# Patient Record
Sex: FEMALE | Race: WHITE | NOT HISPANIC OR LATINO | Employment: OTHER | URBAN - METROPOLITAN AREA
[De-identification: names, ages, dates, MRNs, and addresses within clinical notes are randomized per-mention and may not be internally consistent; named-entity substitution may affect disease eponyms.]

---

## 2023-05-30 LAB
ERYTHROCYTE DISTRIBUTION WIDTH (RATIO) BY AUTOMATED COUNT: 13 % (ref 11.5–14.5)
ERYTHROCYTE MEAN CORPUSCULAR HEMOGLOBIN CONCENTRATION (G/DL) BY AUTOMATED: 31.6 G/DL (ref 32–36)
ERYTHROCYTE MEAN CORPUSCULAR VOLUME (FL) BY AUTOMATED COUNT: 103 FL (ref 80–100)
ERYTHROCYTES (10*6/UL) IN BLOOD BY AUTOMATED COUNT: 4.21 X10E12/L (ref 4–5.2)
HEMATOCRIT (%) IN BLOOD BY AUTOMATED COUNT: 43.3 % (ref 36–46)
HEMOGLOBIN (G/DL) IN BLOOD: 13.7 G/DL (ref 12–16)
LEUKOCYTES (10*3/UL) IN BLOOD BY AUTOMATED COUNT: 4.7 X10E9/L (ref 4.4–11.3)
PLATELETS (10*3/UL) IN BLOOD AUTOMATED COUNT: 191 X10E9/L (ref 150–450)

## 2023-09-14 PROBLEM — C44.41 BASAL CELL CARCINOMA OF SKIN OF SCALP AND NECK: Status: ACTIVE | Noted: 2020-06-17

## 2023-09-14 PROBLEM — L82.1 OTHER SEBORRHEIC KERATOSIS: Status: ACTIVE | Noted: 2020-06-17

## 2023-09-14 PROBLEM — L40.0 PSORIASIS VULGARIS: Status: ACTIVE | Noted: 2020-06-17

## 2023-09-14 PROBLEM — I71.21 ASCENDING AORTIC ANEURYSM (CMS-HCC): Status: ACTIVE | Noted: 2023-09-14

## 2023-09-14 PROBLEM — Z85.828 PERSONAL HISTORY OF OTHER MALIGNANT NEOPLASM OF SKIN: Status: ACTIVE | Noted: 2020-06-17

## 2023-09-14 PROBLEM — H25.813 COMBINED FORM OF AGE-RELATED CATARACT, BOTH EYES: Status: ACTIVE | Noted: 2023-09-14

## 2023-09-14 PROBLEM — D22.5 MELANOCYTIC NEVI OF TRUNK: Status: ACTIVE | Noted: 2020-06-17

## 2023-09-14 PROBLEM — H25.13 CATARACT, NUCLEAR SCLEROTIC, BOTH EYES: Status: ACTIVE | Noted: 2023-09-14

## 2023-09-14 PROBLEM — H35.372 MACULAR PUCKER, LEFT EYE: Status: ACTIVE | Noted: 2023-09-14

## 2023-09-14 PROBLEM — H52.11 MYOPIA OF RIGHT EYE: Status: ACTIVE | Noted: 2023-09-14

## 2023-09-14 PROBLEM — E78.5 HYPERLIPIDEMIA: Status: ACTIVE | Noted: 2023-09-14

## 2023-09-14 PROBLEM — H26.9 CATARACT OF RIGHT EYE: Status: ACTIVE | Noted: 2023-09-14

## 2023-09-14 PROBLEM — Z96.1 PSEUDOPHAKIA OF LEFT EYE: Status: ACTIVE | Noted: 2023-09-14

## 2023-09-14 PROBLEM — L30.9 DERMATITIS, UNSPECIFIED: Status: ACTIVE | Noted: 2020-06-17

## 2023-09-14 PROBLEM — H43.813 PVD (POSTERIOR VITREOUS DETACHMENT), BOTH EYES: Status: ACTIVE | Noted: 2023-09-14

## 2023-09-14 PROBLEM — D18.01 HEMANGIOMA OF SKIN AND SUBCUTANEOUS TISSUE: Status: ACTIVE | Noted: 2020-06-17

## 2023-09-14 PROBLEM — L90.5 SCAR CONDITION AND FIBROSIS OF SKIN: Status: ACTIVE | Noted: 2020-06-17

## 2023-09-14 PROBLEM — C44.91 BASAL CELL CARCINOMA: Status: ACTIVE | Noted: 2023-09-14

## 2023-09-14 PROBLEM — L57.0 ACTINIC KERATOSIS: Status: ACTIVE | Noted: 2020-06-17

## 2023-09-14 PROBLEM — L81.4 OTHER MELANIN HYPERPIGMENTATION: Status: ACTIVE | Noted: 2020-06-17

## 2023-09-14 PROBLEM — D48.5 NEOPLASM OF UNCERTAIN BEHAVIOR OF SKIN: Status: ACTIVE | Noted: 2020-06-17

## 2023-09-14 PROBLEM — I25.10 CORONARY ATHEROSCLEROSIS: Status: ACTIVE | Noted: 2023-09-14

## 2023-09-27 ENCOUNTER — OFFICE VISIT (OUTPATIENT)
Dept: PRIMARY CARE | Facility: CLINIC | Age: 73
End: 2023-09-27
Payer: MEDICARE

## 2023-09-27 VITALS
TEMPERATURE: 97.1 F | DIASTOLIC BLOOD PRESSURE: 78 MMHG | HEART RATE: 78 BPM | BODY MASS INDEX: 30.07 KG/M2 | WEIGHT: 191.6 LBS | OXYGEN SATURATION: 97 % | RESPIRATION RATE: 16 BRPM | SYSTOLIC BLOOD PRESSURE: 132 MMHG | HEIGHT: 67 IN

## 2023-09-27 DIAGNOSIS — Z12.12 SCREENING FOR COLORECTAL CANCER: ICD-10-CM

## 2023-09-27 DIAGNOSIS — L30.9 DERMATITIS, UNSPECIFIED: ICD-10-CM

## 2023-09-27 DIAGNOSIS — L40.0 PSORIASIS VULGARIS: ICD-10-CM

## 2023-09-27 DIAGNOSIS — Z00.00 HEALTHCARE MAINTENANCE: ICD-10-CM

## 2023-09-27 DIAGNOSIS — Z78.0 ASYMPTOMATIC MENOPAUSAL STATE: ICD-10-CM

## 2023-09-27 DIAGNOSIS — Z12.31 ENCOUNTER FOR SCREENING MAMMOGRAM FOR BREAST CANCER: ICD-10-CM

## 2023-09-27 DIAGNOSIS — Z00.00 ROUTINE GENERAL MEDICAL EXAMINATION AT HEALTH CARE FACILITY: Primary | ICD-10-CM

## 2023-09-27 DIAGNOSIS — Z00.00 HEALTH MAINTENANCE EXAMINATION: ICD-10-CM

## 2023-09-27 DIAGNOSIS — E78.5 HYPERLIPIDEMIA, UNSPECIFIED HYPERLIPIDEMIA TYPE: ICD-10-CM

## 2023-09-27 DIAGNOSIS — Z12.11 SCREENING FOR COLORECTAL CANCER: ICD-10-CM

## 2023-09-27 PROCEDURE — G0439 PPPS, SUBSEQ VISIT: HCPCS | Performed by: INTERNAL MEDICINE

## 2023-09-27 PROCEDURE — 1125F AMNT PAIN NOTED PAIN PRSNT: CPT | Performed by: INTERNAL MEDICINE

## 2023-09-27 PROCEDURE — 1159F MED LIST DOCD IN RCRD: CPT | Performed by: INTERNAL MEDICINE

## 2023-09-27 PROCEDURE — 1036F TOBACCO NON-USER: CPT | Performed by: INTERNAL MEDICINE

## 2023-09-27 PROCEDURE — 93000 ELECTROCARDIOGRAM COMPLETE: CPT | Performed by: INTERNAL MEDICINE

## 2023-09-27 PROCEDURE — 1170F FXNL STATUS ASSESSED: CPT | Performed by: INTERNAL MEDICINE

## 2023-09-27 PROCEDURE — 90662 IIV NO PRSV INCREASED AG IM: CPT | Performed by: INTERNAL MEDICINE

## 2023-09-27 PROCEDURE — G0008 ADMIN INFLUENZA VIRUS VAC: HCPCS | Performed by: INTERNAL MEDICINE

## 2023-09-27 PROCEDURE — 99397 PER PM REEVAL EST PAT 65+ YR: CPT | Performed by: INTERNAL MEDICINE

## 2023-09-27 RX ORDER — ROSUVASTATIN CALCIUM 10 MG/1
10 TABLET, COATED ORAL DAILY
Qty: 30 TABLET | Refills: 5 | Status: SHIPPED | OUTPATIENT
Start: 2023-09-27 | End: 2024-04-16 | Stop reason: SDUPTHER

## 2023-09-27 RX ORDER — ATORVASTATIN CALCIUM 20 MG/1
20 TABLET, FILM COATED ORAL DAILY
COMMUNITY
End: 2023-09-27 | Stop reason: SINTOL

## 2023-09-27 ASSESSMENT — ACTIVITIES OF DAILY LIVING (ADL)
DRESSING: INDEPENDENT
GROCERY_SHOPPING: INDEPENDENT
MANAGING_FINANCES: INDEPENDENT
DOING_HOUSEWORK: INDEPENDENT
TAKING_MEDICATION: INDEPENDENT
BATHING: INDEPENDENT

## 2023-09-27 ASSESSMENT — PATIENT HEALTH QUESTIONNAIRE - PHQ9
2. FEELING DOWN, DEPRESSED OR HOPELESS: NOT AT ALL
1. LITTLE INTEREST OR PLEASURE IN DOING THINGS: NOT AT ALL
SUM OF ALL RESPONSES TO PHQ9 QUESTIONS 1 AND 2: 0

## 2023-09-27 ASSESSMENT — ENCOUNTER SYMPTOMS
UNEXPECTED WEIGHT CHANGE: 0
WHEEZING: 0
HEADACHES: 0
LIGHT-HEADEDNESS: 0
HEMATURIA: 0
PALPITATIONS: 0
CHEST TIGHTNESS: 0
CONSTIPATION: 0
DIFFICULTY URINATING: 0
VOICE CHANGE: 0
FATIGUE: 0
DIARRHEA: 0
COUGH: 0
VOMITING: 0
ACTIVITY CHANGE: 0
NAUSEA: 0
APPETITE CHANGE: 0
DIZZINESS: 0
BLOOD IN STOOL: 0
TREMORS: 0
TROUBLE SWALLOWING: 0
ARTHRALGIAS: 0

## 2023-09-27 NOTE — PROGRESS NOTES
"Subjective   Reason for Visit: Lashawn Bear is an 73 y.o. female here for a Medicare Wellness visit.     Past Medical, Surgical, and Family History reviewed and updated in chart.    Reviewed all medications by prescribing practitioner or clinical pharmacist (such as prescriptions, OTCs, herbal therapies and supplements) and documented in the medical record.    74 yo here for AMWV  Feels well  No complaints    The rash on her legs improved off atorvastatin but recurred when started  Saw derm - allergic          Patient Care Team:  Montse Lock MD as PCP - General  Montse Lock MD as PCP - [employer] PCP     Review of Systems   Constitutional:  Negative for activity change, appetite change, fatigue and unexpected weight change.   HENT:  Negative for ear pain, hearing loss, tinnitus, trouble swallowing and voice change.    Eyes:  Negative for visual disturbance.   Respiratory:  Negative for cough, chest tightness and wheezing.    Cardiovascular:  Negative for chest pain, palpitations and leg swelling.   Gastrointestinal:  Negative for blood in stool, constipation, diarrhea, nausea and vomiting.   Genitourinary:  Negative for difficulty urinating, hematuria and vaginal bleeding.   Musculoskeletal:  Negative for arthralgias.   Skin:  Negative for rash.   Neurological:  Negative for dizziness, tremors, syncope, light-headedness and headaches.       Objective   Vitals:  /78   Pulse 78   Temp 36.2 °C (97.1 °F)   Resp 16   Ht 1.689 m (5' 6.5\")   Wt 86.9 kg (191 lb 9.6 oz)   SpO2 97%   BMI 30.46 kg/m²       Physical Exam  Constitutional:       General: She is not in acute distress.     Appearance: She is well-developed. She is not diaphoretic.   HENT:      Head: Normocephalic.      Right Ear: Tympanic membrane normal. There is no impacted cerumen.      Left Ear: Tympanic membrane normal. There is no impacted cerumen.      Nose: Nose normal.      Mouth/Throat:      Mouth: Mucous " membranes are moist.      Pharynx: Oropharynx is clear. No oropharyngeal exudate or posterior oropharyngeal erythema.   Eyes:      General: No scleral icterus.     Extraocular Movements: Extraocular movements intact.      Conjunctiva/sclera: Conjunctivae normal.      Pupils: Pupils are equal, round, and reactive to light.   Neck:      Thyroid: No thyromegaly.      Vascular: No JVD.   Cardiovascular:      Rate and Rhythm: Normal rate and regular rhythm.      Pulses: Normal pulses.      Heart sounds: Normal heart sounds. No murmur heard.     No friction rub. No gallop.   Pulmonary:      Effort: Pulmonary effort is normal. No respiratory distress.      Breath sounds: Normal breath sounds. No wheezing or rales.   Chest:      Chest wall: No tenderness.   Abdominal:      General: Bowel sounds are normal. There is no distension.      Palpations: Abdomen is soft. There is no mass.      Tenderness: There is no abdominal tenderness. There is no rebound.   Musculoskeletal:         General: Normal range of motion.      Cervical back: Normal range of motion and neck supple.   Lymphadenopathy:      Cervical: No cervical adenopathy.   Skin:     General: Skin is warm and dry.   Neurological:      General: No focal deficit present.      Mental Status: She is alert and oriented to person, place, and time.      Deep Tendon Reflexes: Reflexes normal.   Psychiatric:         Mood and Affect: Mood normal.         Thought Content: Thought content normal.         Assessment/Plan   Problem List Items Addressed This Visit       Dermatitis, unspecified    Hyperlipidemia    Current Assessment & Plan     On Rosuvastatin          Other Visit Diagnoses       Routine general medical examination at health care facility    -  Primary    Relevant Orders    1 Year Follow Up In [facility] - PCP - Wellness Exam    Health maintenance examination        Healthcare maintenance        Asymptomatic menopausal state        Relevant Orders    XR DEXA  bone density    Encounter for screening mammogram for breast cancer        Relevant Orders    BI mammo bilateral screening tomosynthesis    Screening for colorectal cancer        Relevant Orders    Cologuard® colon cancer screening             Follow up with me in 1 year

## 2023-10-08 LAB — NONINV COLON CA DNA+OCC BLD SCRN STL QL: NEGATIVE

## 2023-10-10 ENCOUNTER — ANCILLARY PROCEDURE (OUTPATIENT)
Dept: RADIOLOGY | Facility: CLINIC | Age: 73
End: 2023-10-10
Payer: MEDICARE

## 2023-10-10 DIAGNOSIS — Z78.0 ASYMPTOMATIC MENOPAUSAL STATE: ICD-10-CM

## 2023-10-10 PROCEDURE — 77080 DXA BONE DENSITY AXIAL: CPT | Performed by: STUDENT IN AN ORGANIZED HEALTH CARE EDUCATION/TRAINING PROGRAM

## 2023-10-10 PROCEDURE — 77080 DXA BONE DENSITY AXIAL: CPT

## 2023-12-12 ENCOUNTER — LAB (OUTPATIENT)
Dept: LAB | Facility: LAB | Age: 73
End: 2023-12-12
Payer: MEDICARE

## 2023-12-12 DIAGNOSIS — Z00.00 HEALTH MAINTENANCE EXAMINATION: ICD-10-CM

## 2023-12-12 DIAGNOSIS — L30.9 DERMATITIS, UNSPECIFIED: ICD-10-CM

## 2023-12-12 DIAGNOSIS — L40.0 PSORIASIS VULGARIS: ICD-10-CM

## 2023-12-12 DIAGNOSIS — Z00.00 HEALTHCARE MAINTENANCE: ICD-10-CM

## 2023-12-12 LAB
ALBUMIN SERPL BCP-MCNC: 4.1 G/DL (ref 3.4–5)
ALP SERPL-CCNC: 50 U/L (ref 33–136)
ALT SERPL W P-5'-P-CCNC: 10 U/L (ref 7–45)
ANION GAP SERPL CALC-SCNC: 12 MMOL/L (ref 10–20)
AST SERPL W P-5'-P-CCNC: 16 U/L (ref 9–39)
BILIRUB SERPL-MCNC: 0.8 MG/DL (ref 0–1.2)
BUN SERPL-MCNC: 11 MG/DL (ref 6–23)
CALCIUM SERPL-MCNC: 9.1 MG/DL (ref 8.6–10.3)
CHLORIDE SERPL-SCNC: 103 MMOL/L (ref 98–107)
CHOLEST SERPL-MCNC: 224 MG/DL (ref 0–199)
CHOLESTEROL/HDL RATIO: 3.3
CO2 SERPL-SCNC: 29 MMOL/L (ref 21–32)
CREAT SERPL-MCNC: 0.92 MG/DL (ref 0.5–1.05)
ERYTHROCYTE [DISTWIDTH] IN BLOOD BY AUTOMATED COUNT: 13.1 % (ref 11.5–14.5)
GFR SERPL CREATININE-BSD FRML MDRD: 66 ML/MIN/1.73M*2
GLUCOSE SERPL-MCNC: 97 MG/DL (ref 74–99)
HCT VFR BLD AUTO: 46 % (ref 36–46)
HDLC SERPL-MCNC: 67.8 MG/DL
HGB BLD-MCNC: 14.7 G/DL (ref 12–16)
LDLC SERPL CALC-MCNC: 142 MG/DL
MCH RBC QN AUTO: 32.7 PG (ref 26–34)
MCHC RBC AUTO-ENTMCNC: 32 G/DL (ref 32–36)
MCV RBC AUTO: 102 FL (ref 80–100)
NON HDL CHOLESTEROL: 156 MG/DL (ref 0–149)
NRBC BLD-RTO: 0 /100 WBCS (ref 0–0)
PLATELET # BLD AUTO: 179 X10*3/UL (ref 150–450)
POTASSIUM SERPL-SCNC: 4.2 MMOL/L (ref 3.5–5.3)
PROT SERPL-MCNC: 7 G/DL (ref 6.4–8.2)
RBC # BLD AUTO: 4.49 X10*6/UL (ref 4–5.2)
SODIUM SERPL-SCNC: 140 MMOL/L (ref 136–145)
TRIGL SERPL-MCNC: 69 MG/DL (ref 0–149)
TSH SERPL-ACNC: 1.57 MIU/L (ref 0.44–3.98)
VLDL: 14 MG/DL (ref 0–40)
WBC # BLD AUTO: 3.3 X10*3/UL (ref 4.4–11.3)

## 2023-12-12 PROCEDURE — 80061 LIPID PANEL: CPT

## 2023-12-12 PROCEDURE — 80053 COMPREHEN METABOLIC PANEL: CPT

## 2023-12-12 PROCEDURE — 84443 ASSAY THYROID STIM HORMONE: CPT

## 2023-12-12 PROCEDURE — 36415 COLL VENOUS BLD VENIPUNCTURE: CPT

## 2023-12-12 PROCEDURE — 85027 COMPLETE CBC AUTOMATED: CPT

## 2023-12-13 DIAGNOSIS — Z00.00 ANNUAL PHYSICAL EXAM: Primary | ICD-10-CM

## 2023-12-21 ENCOUNTER — ANCILLARY PROCEDURE (OUTPATIENT)
Dept: RADIOLOGY | Facility: CLINIC | Age: 73
End: 2023-12-21
Payer: MEDICARE

## 2023-12-21 DIAGNOSIS — Z12.31 ENCOUNTER FOR SCREENING MAMMOGRAM FOR BREAST CANCER: ICD-10-CM

## 2023-12-21 PROCEDURE — 77067 SCR MAMMO BI INCL CAD: CPT | Mod: BILATERAL PROCEDURE | Performed by: RADIOLOGY

## 2023-12-21 PROCEDURE — 77067 SCR MAMMO BI INCL CAD: CPT

## 2023-12-21 PROCEDURE — 77063 BREAST TOMOSYNTHESIS BI: CPT | Mod: BILATERAL PROCEDURE | Performed by: RADIOLOGY

## 2024-04-16 DIAGNOSIS — E78.5 HYPERLIPIDEMIA, UNSPECIFIED HYPERLIPIDEMIA TYPE: ICD-10-CM

## 2024-04-16 RX ORDER — ROSUVASTATIN CALCIUM 10 MG/1
10 TABLET, COATED ORAL DAILY
Qty: 90 TABLET | Refills: 3 | Status: SHIPPED | OUTPATIENT
Start: 2024-04-16 | End: 2025-04-16

## 2024-04-18 ENCOUNTER — LAB (OUTPATIENT)
Dept: LAB | Facility: LAB | Age: 74
End: 2024-04-18
Payer: MEDICARE

## 2024-04-18 DIAGNOSIS — Z00.00 ANNUAL PHYSICAL EXAM: ICD-10-CM

## 2024-04-18 LAB
CHOLEST SERPL-MCNC: 169 MG/DL (ref 0–199)
CHOLESTEROL/HDL RATIO: 2.5
HDLC SERPL-MCNC: 67.5 MG/DL
LDLC SERPL CALC-MCNC: 87 MG/DL
NON HDL CHOLESTEROL: 102 MG/DL (ref 0–149)
TRIGL SERPL-MCNC: 75 MG/DL (ref 0–149)
VLDL: 15 MG/DL (ref 0–40)

## 2024-04-18 PROCEDURE — 80061 LIPID PANEL: CPT

## 2024-04-18 PROCEDURE — 36415 COLL VENOUS BLD VENIPUNCTURE: CPT

## 2024-04-19 NOTE — RESULT ENCOUNTER NOTE
It's Dr. Trent Phillips, covering for Dr. Lock    Thank you for doing the fasting cholesterol panel.  I am pleased that the cholesterol panel has improved significantly from last time.      Sincerely,    Trent Phillpis MD   - covering physician for Dr. Lock  
No adenopathy or splenomegaly. No cervical or inguinal lymphadenopathy.

## 2024-09-24 ENCOUNTER — OFFICE VISIT (OUTPATIENT)
Dept: OBSTETRICS AND GYNECOLOGY | Facility: HOSPITAL | Age: 74
End: 2024-09-24
Payer: MEDICARE

## 2024-09-24 VITALS — BODY MASS INDEX: 31 KG/M2 | SYSTOLIC BLOOD PRESSURE: 132 MMHG | WEIGHT: 195 LBS | DIASTOLIC BLOOD PRESSURE: 77 MMHG

## 2024-09-24 DIAGNOSIS — N95.0 POSTMENOPAUSAL BLEEDING: Primary | ICD-10-CM

## 2024-09-24 PROCEDURE — 1160F RVW MEDS BY RX/DR IN RCRD: CPT | Performed by: OBSTETRICS & GYNECOLOGY

## 2024-09-24 PROCEDURE — 1159F MED LIST DOCD IN RCRD: CPT | Performed by: OBSTETRICS & GYNECOLOGY

## 2024-09-24 PROCEDURE — 99213 OFFICE O/P EST LOW 20 MIN: CPT | Performed by: OBSTETRICS & GYNECOLOGY

## 2024-09-24 PROCEDURE — 1036F TOBACCO NON-USER: CPT | Performed by: OBSTETRICS & GYNECOLOGY

## 2024-09-24 PROCEDURE — 1126F AMNT PAIN NOTED NONE PRSNT: CPT | Performed by: OBSTETRICS & GYNECOLOGY

## 2024-09-24 ASSESSMENT — ENCOUNTER SYMPTOMS
OCCASIONAL FEELINGS OF UNSTEADINESS: 0
LOSS OF SENSATION IN FEET: 0
DEPRESSION: 0

## 2024-09-24 ASSESSMENT — PAIN SCALES - GENERAL: PAINLEVEL: 0-NO PAIN

## 2024-09-24 NOTE — PROGRESS NOTES
"Subjective   Patient ID: Lashawn Bear \"Leona\" is a 74 y.o. female who presents for Abnormal Uterine Bleeding (Patient here to discuss postmenopausal bleeding/Patient reports bledding began second week of August/Patient reports bleeding on and off since then/not bleeding today/Patient reports having D&C previously for this issue/Patient denies falls/Patient denies pain).  74 year old patient, here today with complaint of vaginal bleeding in early August. Has been bleeding off and on since.   In 2020, underwent D&C for bleeding. Pathology showed atrophic tissue.   Not heavy- will use a tampon for the whole day. Started out like a menses.     Review of Systems   All other systems reviewed and are negative.    Objective   Physical Exam  Constitutional:       Appearance: Normal appearance.   HENT:      Head: Normocephalic and atraumatic.   Neurological:      General: No focal deficit present.      Mental Status: She is alert and oriented to person, place, and time.   Psychiatric:         Mood and Affect: Mood normal.         Thought Content: Thought content normal.     Assessment/Plan   Problem List Items Addressed This Visit    None  Visit Diagnoses         Codes    Postmenopausal bleeding    -  Primary N95.0    Relevant Orders    US PELVIS TRANSABDOMINAL WITH TRANSVAGINAL          Will likely need another D&C.        Buffy Busby MD 09/24/24 10:58 AM   "

## 2024-09-25 ENCOUNTER — HOSPITAL ENCOUNTER (OUTPATIENT)
Dept: RADIOLOGY | Facility: CLINIC | Age: 74
Discharge: HOME | End: 2024-09-25
Payer: MEDICARE

## 2024-09-25 DIAGNOSIS — N95.0 POSTMENOPAUSAL BLEEDING: ICD-10-CM

## 2024-09-25 PROCEDURE — 76830 TRANSVAGINAL US NON-OB: CPT | Performed by: RADIOLOGY

## 2024-09-25 PROCEDURE — 76856 US EXAM PELVIC COMPLETE: CPT

## 2024-09-25 PROCEDURE — 76857 US EXAM PELVIC LIMITED: CPT | Performed by: RADIOLOGY

## 2024-09-26 ENCOUNTER — APPOINTMENT (OUTPATIENT)
Dept: PRIMARY CARE | Facility: CLINIC | Age: 74
End: 2024-09-26
Payer: MEDICARE

## 2024-09-26 VITALS
HEART RATE: 77 BPM | DIASTOLIC BLOOD PRESSURE: 69 MMHG | SYSTOLIC BLOOD PRESSURE: 124 MMHG | OXYGEN SATURATION: 98 % | HEIGHT: 67 IN | WEIGHT: 194 LBS | TEMPERATURE: 97.2 F | BODY MASS INDEX: 30.45 KG/M2

## 2024-09-26 DIAGNOSIS — Z00.00 ROUTINE GENERAL MEDICAL EXAMINATION AT HEALTH CARE FACILITY: Primary | ICD-10-CM

## 2024-09-26 DIAGNOSIS — N93.8 DYSFUNCTIONAL UTERINE BLEEDING: ICD-10-CM

## 2024-09-26 DIAGNOSIS — E78.2 MIXED HYPERLIPIDEMIA: ICD-10-CM

## 2024-09-26 DIAGNOSIS — Z12.31 ENCOUNTER FOR SCREENING MAMMOGRAM FOR BREAST CANCER: ICD-10-CM

## 2024-09-26 DIAGNOSIS — Z00.00 ROUTINE GENERAL MEDICAL EXAMINATION AT A HEALTH CARE FACILITY: ICD-10-CM

## 2024-09-26 PROBLEM — I71.21 ASCENDING AORTIC ANEURYSM (CMS-HCC): Status: RESOLVED | Noted: 2023-09-14 | Resolved: 2024-09-26

## 2024-09-26 ASSESSMENT — ACTIVITIES OF DAILY LIVING (ADL)
MANAGING_FINANCES: INDEPENDENT
GROCERY_SHOPPING: INDEPENDENT
DRESSING: INDEPENDENT
BATHING: INDEPENDENT
TAKING_MEDICATION: INDEPENDENT
DOING_HOUSEWORK: INDEPENDENT

## 2024-09-26 ASSESSMENT — ENCOUNTER SYMPTOMS
COUGH: 0
LIGHT-HEADEDNESS: 0
WHEEZING: 0
BLOOD IN STOOL: 0
DIARRHEA: 0
DIFFICULTY URINATING: 0
PALPITATIONS: 0
UNEXPECTED WEIGHT CHANGE: 0
APPETITE CHANGE: 0
CONSTIPATION: 0
NAUSEA: 0
ARTHRALGIAS: 0
ACTIVITY CHANGE: 0
FATIGUE: 0
HEADACHES: 0
HEMATURIA: 0
VOMITING: 0
VOICE CHANGE: 0
DIZZINESS: 0
TREMORS: 0
CHEST TIGHTNESS: 0
TROUBLE SWALLOWING: 0

## 2024-09-26 NOTE — PROGRESS NOTES
"Subjective   Reason for Visit: Lashawn Bear is an 74 y.o. female here for a Medicare Wellness visit.     Past Medical, Surgical, and Family History reviewed and updated in chart.    Reviewed all medications by prescribing practitioner or clinical pharmacist (such as prescriptions, OTCs, herbal therapies and supplements) and documented in the medical record.    75 yo here for AMWV  Feels well  Taking all her meds  Has had vaginal bleeding for 7 weeks now - saw Dr Barbosa who ordered a pelvic US      2 children - daughter is an author - ; son  Nonsmoker  Alcohol - 7-10 per night  Marijuana - none  Exercise - 3-4 times a week      Patient Care Team:  Montse Lock MD as PCP - General  Montse Lock MD as PCP - [employer] PCP     Review of Systems   Constitutional:  Negative for activity change, appetite change, fatigue and unexpected weight change.   HENT:  Negative for ear pain, hearing loss, tinnitus, trouble swallowing and voice change.    Eyes:  Negative for visual disturbance.   Respiratory:  Negative for cough, chest tightness and wheezing.    Cardiovascular:  Negative for chest pain, palpitations and leg swelling.   Gastrointestinal:  Negative for blood in stool, constipation, diarrhea, nausea and vomiting.   Genitourinary:  Negative for difficulty urinating, hematuria and vaginal bleeding.   Musculoskeletal:  Negative for arthralgias.   Skin:  Negative for rash.   Neurological:  Negative for dizziness, tremors, syncope, light-headedness and headaches.       Objective   Vitals:  /69   Pulse 77   Temp 36.2 °C (97.2 °F)   Ht 1.689 m (5' 6.5\")   Wt 88 kg (194 lb)   SpO2 98%   BMI 30.84 kg/m²       Physical Exam  Constitutional:       General: She is not in acute distress.     Appearance: She is well-developed. She is not diaphoretic.   HENT:      Head: Normocephalic.      Right Ear: Tympanic membrane normal. There is no impacted cerumen.      Left " Ear: Tympanic membrane normal. There is no impacted cerumen.      Nose: Nose normal.      Mouth/Throat:      Mouth: Mucous membranes are moist.      Pharynx: Oropharynx is clear. No oropharyngeal exudate or posterior oropharyngeal erythema.   Eyes:      General: No scleral icterus.     Extraocular Movements: Extraocular movements intact.      Conjunctiva/sclera: Conjunctivae normal.      Pupils: Pupils are equal, round, and reactive to light.   Neck:      Thyroid: No thyromegaly.      Vascular: No JVD.   Cardiovascular:      Rate and Rhythm: Normal rate and regular rhythm.      Pulses: Normal pulses.      Heart sounds: Normal heart sounds. No murmur heard.     No friction rub. No gallop.   Pulmonary:      Effort: Pulmonary effort is normal. No respiratory distress.      Breath sounds: Normal breath sounds. No wheezing or rales.   Chest:      Chest wall: No tenderness.   Abdominal:      General: Bowel sounds are normal. There is no distension.      Palpations: Abdomen is soft. There is no mass.      Tenderness: There is no abdominal tenderness. There is no rebound.   Musculoskeletal:         General: Normal range of motion.      Cervical back: Normal range of motion and neck supple.   Lymphadenopathy:      Cervical: No cervical adenopathy.   Skin:     General: Skin is warm and dry.   Neurological:      General: No focal deficit present.      Mental Status: She is alert and oriented to person, place, and time.      Deep Tendon Reflexes: Reflexes normal.   Psychiatric:         Mood and Affect: Mood normal.         Thought Content: Thought content normal.         Assessment & Plan  Routine general medical examination at health care facility    Orders:    1 Year Follow Up In [facility] - PCP - Wellness Exam    ECG 12 lead (Clinic Performed)    1 Year Follow Up In [facility] - PCP - Wellness Exam; Future    Routine general medical examination at a health care facility    Orders:    Comprehensive  Metabolic Panel; Future    Lipid Panel; Future    TSH with reflex to Free T4 if abnormal; Future    Dysfunctional uterine bleeding  Per Gyn -  Orders:    CBC; Future    Encounter for screening mammogram for breast cancer    Orders:    BI mammo bilateral screening tomosynthesis; Future    Mixed hyperlipidemia  On statin        [facility] docs on file - full code      Depression Screening  5 - 10 minutes were spent screening for depression.   Follow up with me in 1 year

## 2024-09-27 ENCOUNTER — LAB (OUTPATIENT)
Dept: LAB | Facility: LAB | Age: 74
End: 2024-09-27
Payer: MEDICARE

## 2024-09-27 ENCOUNTER — TELEPHONE (OUTPATIENT)
Dept: OBSTETRICS AND GYNECOLOGY | Facility: HOSPITAL | Age: 74
End: 2024-09-27

## 2024-09-27 ENCOUNTER — PREP FOR PROCEDURE (OUTPATIENT)
Dept: OBSTETRICS AND GYNECOLOGY | Facility: HOSPITAL | Age: 74
End: 2024-09-27

## 2024-09-27 DIAGNOSIS — N95.0 POSTMENOPAUSAL BLEEDING: Primary | ICD-10-CM

## 2024-09-27 DIAGNOSIS — N93.8 DYSFUNCTIONAL UTERINE BLEEDING: ICD-10-CM

## 2024-09-27 DIAGNOSIS — Z00.00 ROUTINE GENERAL MEDICAL EXAMINATION AT A HEALTH CARE FACILITY: ICD-10-CM

## 2024-09-27 LAB
ALBUMIN SERPL BCP-MCNC: 4 G/DL (ref 3.4–5)
ALP SERPL-CCNC: 56 U/L (ref 33–136)
ALT SERPL W P-5'-P-CCNC: 11 U/L (ref 7–45)
ANION GAP SERPL CALC-SCNC: 14 MMOL/L (ref 10–20)
AST SERPL W P-5'-P-CCNC: 13 U/L (ref 9–39)
BILIRUB SERPL-MCNC: 0.8 MG/DL (ref 0–1.2)
BUN SERPL-MCNC: 11 MG/DL (ref 6–23)
CALCIUM SERPL-MCNC: 9 MG/DL (ref 8.6–10.6)
CHLORIDE SERPL-SCNC: 104 MMOL/L (ref 98–107)
CHOLEST SERPL-MCNC: 166 MG/DL (ref 0–199)
CHOLESTEROL/HDL RATIO: 2.4
CO2 SERPL-SCNC: 29 MMOL/L (ref 21–32)
CREAT SERPL-MCNC: 0.92 MG/DL (ref 0.5–1.05)
EGFRCR SERPLBLD CKD-EPI 2021: 66 ML/MIN/1.73M*2
ERYTHROCYTE [DISTWIDTH] IN BLOOD BY AUTOMATED COUNT: 12.6 % (ref 11.5–14.5)
GLUCOSE SERPL-MCNC: 95 MG/DL (ref 74–99)
HCT VFR BLD AUTO: 42.8 % (ref 36–46)
HDLC SERPL-MCNC: 68.8 MG/DL
HGB BLD-MCNC: 14.3 G/DL (ref 12–16)
LDLC SERPL CALC-MCNC: 87 MG/DL
MCH RBC QN AUTO: 32.9 PG (ref 26–34)
MCHC RBC AUTO-ENTMCNC: 33.4 G/DL (ref 32–36)
MCV RBC AUTO: 99 FL (ref 80–100)
NON HDL CHOLESTEROL: 97 MG/DL (ref 0–149)
NRBC BLD-RTO: 0 /100 WBCS (ref 0–0)
PLATELET # BLD AUTO: 204 X10*3/UL (ref 150–450)
POTASSIUM SERPL-SCNC: 4.5 MMOL/L (ref 3.5–5.3)
PROT SERPL-MCNC: 6.5 G/DL (ref 6.4–8.2)
RBC # BLD AUTO: 4.34 X10*6/UL (ref 4–5.2)
SODIUM SERPL-SCNC: 142 MMOL/L (ref 136–145)
TRIGL SERPL-MCNC: 51 MG/DL (ref 0–149)
TSH SERPL-ACNC: 1.69 MIU/L (ref 0.44–3.98)
VLDL: 10 MG/DL (ref 0–40)
WBC # BLD AUTO: 4.7 X10*3/UL (ref 4.4–11.3)

## 2024-09-27 PROCEDURE — 36415 COLL VENOUS BLD VENIPUNCTURE: CPT

## 2024-09-27 RX ORDER — SODIUM CHLORIDE, SODIUM LACTATE, POTASSIUM CHLORIDE, CALCIUM CHLORIDE 600; 310; 30; 20 MG/100ML; MG/100ML; MG/100ML; MG/100ML
20 INJECTION, SOLUTION INTRAVENOUS CONTINUOUS
OUTPATIENT
Start: 2024-09-27

## 2024-09-27 NOTE — TELEPHONE ENCOUNTER
----- Message from Buffy Busby sent at 9/27/2024  7:14 AM EDT -----  Endometrium 7 mm. Recommend endometrial sampling. We had trouble previously getting an EMB and had to do a hysteroscopy for her. Can we confirm that is her preference. Thanks.

## 2024-09-27 NOTE — TELEPHONE ENCOUNTER
RN called patient to discuss ultrasound results  Left voicemail encouraging to call the office back    Janice COBBN, RN

## 2024-09-27 NOTE — TELEPHONE ENCOUNTER
Patient returning RN's call  Verified name and   RN informed patient that endometrium was measuring at 7mm thickness and an endometrium biopsy is recommended  Patient understood   Due to patient's history of having difficulty obtaining an EMB in office a hysteroscopy was offered per Dr. Busby   Patient prefers to have hysteroscopy and if necessary D&C in the OR.   RN made Dr. Busby aware.     All questions and concerns were addressed at the time of the call.   Janice COBBN, RN

## 2024-10-01 ENCOUNTER — TELEPHONE (OUTPATIENT)
Dept: OBSTETRICS AND GYNECOLOGY | Facility: HOSPITAL | Age: 74
End: 2024-10-01
Payer: MEDICARE

## 2024-10-01 NOTE — TELEPHONE ENCOUNTER
Called patient and left message. It appears patient was previously contacted by GYN scheduling  [phone]. Left message for patient to call back.  JEROD TORRES RN

## 2024-10-02 ENCOUNTER — TELEPHONE (OUTPATIENT)
Dept: OBSTETRICS AND GYNECOLOGY | Facility: HOSPITAL | Age: 74
End: 2024-10-02
Payer: MEDICARE

## 2024-10-02 PROBLEM — N95.0 POSTMENOPAUSAL BLEEDING: Status: ACTIVE | Noted: 2024-09-27

## 2024-10-02 NOTE — TELEPHONE ENCOUNTER
Patient called to find out how to schedule her procedure. Patient advised to call [phone] to schedule her procedure. Patient confirmed understanding and no further questions or concerns. JEROD TORRES RN

## 2024-10-04 DIAGNOSIS — N95.0 POSTMENOPAUSAL BLEEDING: Primary | ICD-10-CM

## 2024-10-28 NOTE — PREPROCEDURE INSTRUCTIONS
Pre-Op Instructions &?Checklist       Your surgery has been scheduled at [facility] at [address] [place], [address], [address], in the [facility]. Parking is to the left of the main entrance.      You will be contacted about the time of your surgery the day before your surgery (if your surgery is on a Monday, you will be called the Friday before surgery). If you are unable to answer the phone, a detailed voicemail message will be left. Make sure that your voicemail box is not full so a message can be left. If you have not received a call by 3:00 pm you may call [phone] between the hours of 3:00 and 4:00 pm. Please be available by phone the night before/day of surgery in case there is a change in the schedule which may require you to arrive earlier/later.      ?      14 DAYS BEFORE SURGERY STOP TAKING WEIGHT LOSS MEDICATIONS       ?7 DAYS BEFORE SURGERY STOP THESE MEDICATIONS:       * Multiple Vitamins containing Vitamin E       * Herbal supplements, Fish Oil, garlic pills, turmeric, CoQ enzyme       *Stop taking aspirin, aspirin-containing products, and NSAID's like Advil, Motrin, Aleve, and Ibuprofen. Tylenol is okay to take for pain relief.        *If you are currently taking Coumadin/Warfarin, we will have to coordinate that with your PCP &/or the Anticoagulation Clinic.      THE DAY BEFORE SURGERY:       *Do not eat any food after midnight the night before surgery.        *You are permitted to have no more than 4 ounces of clear liquids such as water, apple juice, plain tea or coffee (no milk or creamer), clear electrolyte-replenishing drinks such as Pedialyte, Gatorade, or         Powerade  (not yogurt or pulp-containing smoothies or juices such as orange juice) up to 3 hours before your arrival time.      DAY OF SURGERY TAKE THESE MEDICATIONS (if it is not listed, do not take it.)    There are no medications for you to take on the morning of surgery.     ON THE  MORNING OF SURGERY:       *Shower either the night before your surgery or the morning of your surgery       *Do not use moisturizers, creams, lotions or perfume, or make-up.       *Wear comfortable, loose fitting clothing.        *All jewelry and valuables should be left at home.       *Prosthetic devices such as contact lenses, hearing aids, dentures, eyelash extensions, hairpins and body piercings must be removed before surgery. Bring containers for eyeglasses/contacts, dentures, or         hearing aids with you.      ? Diabetics: Please check fasting blood sugars upon waking up. ?If fasting blood sugars are <80ml/dl, please drink 100ml/3oz. of apple juice no later than 2 hours prior to surgery.      ?BRING WITH YOU:        *Photo ID and insurance card       *Current list of medicines and allergies       *Pacemaker/Defibrillator/Heart stent cards       *Copy of your complete Advanced Directive/DHPOA-if applicable      ?SMOKING:       *Quitting smoking can make a huge difference to your health and recovery from surgery. ?       *If you need help with quitting, call 2-642-QUIT-NOW.        ALCOHOL:       *No alcoholic beverages for 48 hours before surgery.      ?AFTER OUTPATIENT SURGERY:       *A responsible adult MUST accompany you at the time of discharge and stay with you for 24 hours after your surgery.       *You may NOT drive yourself home after surgery.       *You may use a taxi or ride sharing service ([employer], [employer]) to return home ONLY if you are accompanied by a friend or family member       *Instructions for resuming your medications will be provided by your surgeon.      CONTACT SURGEON'S OFFICE IF YOU DEVELOP:       *Fever =/>?100.4 F        *New respiratory symptoms (e.g. cough, shortness of breath, respiratory distress, sore throat)       *Recent loss of taste or smell       *Flu like symptoms such as headache, fatigue or gastrointestinal symptoms       *If you develop any open sores, shingles, burning or  painful urination    AND/OR:       *You no longer wish to have the surgery.       *Any other personal circumstances change that may lead to the need to cancel or defer this surgery.       *You were admitted to any hospital within one week of your planned procedure.      ?If you have any questions regarding these preoperative instructions, you may call [phone]. If you have questions regarding you surgical procedure, or post-operative care/recovery please call your surgeon's office.      Link to [facility] Laboratory Services Locations   [email or web address]      Link to [facility]   [email or web address]

## 2024-10-28 NOTE — CPM/PAT H&P
CPM/PAT Evaluation       Name: Lashawn Bear   /Age: 1950       TELEMEDICINE ENCOUNTER  Patient was interviewed by telephone for preadmission testing perioperative risk assessment prior to surgery.    DATE OF CONSULT: 10/28/2024  REFERRING PROVIDER: Dr. Busby  SURGERY, DATE, AND LENGTH: Hysteroscopy with D&C; 2024; 60 minutes    CHIEF COMPLAINT  Postmenopausal bleeding  HPI  Lashawn Bear is a 74-year-old female with intermittent postmenopausal bleeding that began in mid 2024.  Bleeding is not heavy, no clots.  Ultrasound done on 2024 showed endometrium measuring 7 mm in thickness.  Patient has history of D&C for bleeding in .  Patient scheduled for recommended hysteroscopy with D&C.    ACTIVE PROBLEMS  Patient Active Problem List   Diagnosis    Actinic keratosis    Other seborrheic keratosis    Basal cell carcinoma    Basal cell carcinoma of skin of scalp and neck    Cataract, nuclear sclerotic, both eyes    Cataract of right eye    Combined form of age-related cataract, both eyes    Coronary atherosclerosis    Hemangioma of skin and subcutaneous tissue    Dermatitis, unspecified    Scar condition and fibrosis of skin    Hyperlipidemia    Macular pucker, left eye    Melanocytic nevi of trunk    Myopia of right eye    Neoplasm of uncertain behavior of skin    Other melanin hyperpigmentation    Personal history of other malignant neoplasm of skin    Pseudophakia of left eye    Psoriasis vulgaris    PVD (posterior vitreous detachment), both eyes    Postmenopausal bleeding        PAST MEDICAL HISTORY  Past Medical History:   Diagnosis Date    Acute vaginitis     Vaginal infection    Basal cell carcinoma of skin of other part of trunk     Basal cell carcinoma of back    Calculus of gallbladder with acute cholecystitis without obstruction 2021    Acute calculous cholecystitis    Combined forms of age-related cataract, right eye 10/21/2021    Combined forms of  age-related cataract of right eye    Menopausal and female climacteric states     Menopausal symptoms    Myopia, bilateral 2021    Bilateral myopia    Other conditions influencing health status 2021    Small bowel perforation, intraoperative    Personal history of other diseases of the female genital tract     History of breast disorder    Personal history of other diseases of the female genital tract 2020    History of postmenopausal bleeding    Personal history of other medical treatment     History of mammogram    Personal history of other medical treatment 2019    History of screening mammography    Personal history of other specified conditions 2020    No post-op complications    Presbyopia 10/21/2021    Presbyopia O[facility]    Puckering of macula, left eye 10/21/2021    Macular puckering of retina, left eye    Unspecified astigmatism, bilateral 10/21/2021    Astigmatism, bilateral    Unspecified cataract 2021    Cataract of left eye        SURGICAL HISTORY  Past Surgical History:   Procedure Laterality Date    APPENDECTOMY  2014    Appendectomy    BREAST BIOPSY  2014    Biopsy Breast Open     SECTION, CLASSIC  2014     Section    OOPHORECTOMY  2014    Oophorectomy - Unilateral (Removal Of One Ovary)    OTHER SURGICAL HISTORY  2020    Dilation and curettage    OTHER SURGICAL HISTORY  2019    Mohs surgery    OTHER SURGICAL HISTORY  2018    Biopsy Skin    OTHER SURGICAL HISTORY  2021    Cataract surgery    OTHER SURGICAL HISTORY  2021    Small bowel resection    OTHER SURGICAL HISTORY  2021    Cholecystectomy laparoscopic    TONSILLECTOMY  2014    Tonsillectomy        ANESTHESIA HISTORY  Denies problems with anesthesia in the past such as PONV, prolonged sedation, awareness, dental damage, aspiration, cardiac arrest, difficult intubation, or unexpected hospital admissions. Denies family history of  malignant hyperthermia, or pseudocholinesterase deficiency.     SOCIAL HISTORY  Former cigarette smoker; occasional glass of wine; no recreational drug use.  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  She states she is able to do moderate activities such as heavy housework, light yard work.  She denies chest pain, HENSON.  METS 4    FAMILY HISTORY  Family History   Problem Relation Name Age of Onset    Basal cell carcinoma Mother      Other (CARDIAC DISORDER) Mother      Diabetes Father      Alzheimer's disease Father      Breast cancer Sister  65    Seizures Sister      Other (TRAMATIC BRAIN INJURY) Sister          ALLERGIES  No Known Allergies     MEDICATIONS  No current facility-administered medications for this encounter.    Current Outpatient Medications:     rosuvastatin (Crestor) 10 mg tablet, Take 1 tablet (10 mg) by mouth once daily., Disp: 90 tablet, Rfl: 3     REVIEW OF SYSTEMS  Review of Systems   Genitourinary:         Postmenopausal bleeding; thickened endometrium   All other systems reviewed and are negative.    STOP BANG: Negative for BIBI      PHYSICAL EXAM  Deferred    AIRWAY EXAM  Deferred    VITALS  No vitals taken for telemedicine visit  BMI Readings from Last 1 Encounters:   09/26/24 30.84 kg/m²      BP Readings from Last 4 Encounters:   09/26/24 124/69   09/24/24 132/77   09/27/23 132/78   12/20/22 143/84        LABS  Lab Results   Component Value Date    WBC 4.7 09/27/2024    HGB 14.3 09/27/2024    HCT 42.8 09/27/2024    MCV 99 09/27/2024     09/27/2024      Lab Results   Component Value Date    GLUCOSE 95 09/27/2024    CALCIUM 9.0 09/27/2024     09/27/2024    K 4.5 09/27/2024    CO2 29 09/27/2024     09/27/2024    BUN 11 09/27/2024    CREATININE 0.92 09/27/2024      Lab Results   Component Value Date    HGBA1C 5.6 06/21/2021      Lab Results   Component Value Date    CHOL 166 09/27/2024    CHOL 169 04/18/2024    CHOL 224 (H) 12/12/2023     Lab Results   Component  "Value Date    HDL 68.8 09/27/2024    HDL 67.5 04/18/2024    HDL 67.8 12/12/2023     Lab Results   Component Value Date    LDLCALC 87 09/27/2024    LDLCALC 87 04/18/2024    LDLCALC 142 (H) 12/12/2023     Lab Results   Component Value Date    TRIG 51 09/27/2024    TRIG 75 04/18/2024    TRIG 69 12/12/2023     No components found for: \"CHOLHDL\"     IMAGING  Encounter Date: 09/26/24   ECG 12 lead (Clinic Performed)    Narrative    NSR   No ST/T changes  Stable from previous year        Echocardiogram from 06/21/2021  Indication:  Procedure/CPT: Echo Complete w Full [id number]   Study Detail: The following Echo studies were performed: 2D, M-Mode, Doppler and                color flow. Image quality for this study is good.        PHYSICIAN INTERPRETATION:  Left Ventricle: The left ventricular systolic function is normal, with an estimated ejection fraction of 60-65%. There are no regional wall motion abnormalities. The left ventricular cavity size is normal. Spectral Doppler shows a normal pattern of left ventricular diastolic filling.  Left Atrium: The left atrium is normal in size.  Right Ventricle: The right ventricle is normal in size. There is normal right ventricular global systolic function.  Right Atrium: The right atrium is normal in size.  Aortic Valve: The aortic valve is trileaflet. There is minimal aortic valve cusp calcification. There is trace to mild aortic valve regurgitation. The peak instantaneous gradient of the aortic valve is 15.1 mmHg.  Mitral Valve: The mitral valve is normal in structure. There is no evidence of mitral valve regurgitation.  Tricuspid Valve: The tricuspid valve is structurally normal. There is mild tricuspid regurgitation. The Doppler estimated RVSP is within normal limits at 12.6 mmHg.  Pulmonic Valve: The pulmonic valve is structurally normal. There is physiologic pulmonic valve regurgitation.  Pericardium: There is no pericardial effusion noted.  Aorta: The aortic root is " normal. There is no dilatation of the aortic arch. There is no dilatation of the ascending aorta. There is no dilatation of the aortic root.  Systemic Veins: The inferior vena cava appears to be of normal size. There is IVC inspiratory collapse greater than 50%.        CONCLUSIONS:   1. The left ventricular systolic function is normal with a 60-65% estimated ejection fraction.   2. RVSP within normal limits.       ASSESSMENT/PLAN  Postmenopausal bleeding  Hysteroscopy with D&C      Preoperative instructions reviewed in detail with patient during this encounter. A copy of these instructions has been unloaded to  [facility] along with a copy sent to either home email address or mailed to home address.    This note was created in part upon personal review of patient's medical records.  Speech recognition transcription software was used in the creation of this note. Despite proofreading, several typographical errors might be present that might affect the meaning of the content.

## 2024-11-04 ENCOUNTER — TELEPHONE (OUTPATIENT)
Dept: OBSTETRICS AND GYNECOLOGY | Facility: HOSPITAL | Age: 74
End: 2024-11-04
Payer: MEDICARE

## 2024-11-04 NOTE — TELEPHONE ENCOUNTER
"----- Message from Jeanine JOHNSON sent at 10/2/2024  2:39 PM EDT -----  Regarding: ELVIN Busby has a procedure 24 at [facility] in the pm with the attached patient  FYJOHN Busby has a procedure 24 at [facility] in the pm with the attached patient    Letters stating \"Getting Ready For Surgery\", \"Gynecologic Surgery: What to expect\" and the lab locations have been emailed to the patient today.     DR Busby, Pre-OP and or labs can now be ordered if you havent already done so, Thank you, Jeanine    Contacted patient to discuss pre/post op education  Patient identified by name and   Patient scheduled for a Hysteroscopy/D&C with Dr. Busby at [facility] on   Informed patient that:  She will receive a call from the outpatient surgery center the night prior to let her know what time to arrive  She will need someone to drive her to the facility and home  She will need to be NPO for 8 hours prior to arrival time (clear liquids okay up until arrival)  She will need to complete lab work (CBC and T&S) 2-3 days prior to procedure. Patient aware that orders are already in the system and that she will complete 2- days prior to procedure at any  lab  She will shower the night before/morning of and to refrain from wearing jewelry, piercings, contacts, makeup, lotions, deodorants  She should contact the office for any abnormal post op symptoms  Procedure information, GYN what to expect, and enhanced recovery information to be sent to patient via [employer]  Patient scheduled for POV on  with Dr. Busby   All questions and concerns addressed  Encouraged patient to call office with any future questions or concerns   Sophia House RN   "

## 2024-11-05 ENCOUNTER — LAB (OUTPATIENT)
Dept: LAB | Facility: LAB | Age: 74
End: 2024-11-05
Payer: MEDICARE

## 2024-11-05 ENCOUNTER — ANESTHESIA EVENT (OUTPATIENT)
Dept: OPERATING ROOM | Facility: CLINIC | Age: 74
End: 2024-11-05
Payer: MEDICARE

## 2024-11-05 DIAGNOSIS — N95.0 POSTMENOPAUSAL BLEEDING: ICD-10-CM

## 2024-11-05 LAB
ANION GAP SERPL CALC-SCNC: 13 MMOL/L (ref 10–20)
BUN SERPL-MCNC: 13 MG/DL (ref 6–23)
CALCIUM SERPL-MCNC: 9.2 MG/DL (ref 8.6–10.6)
CHLORIDE SERPL-SCNC: 104 MMOL/L (ref 98–107)
CO2 SERPL-SCNC: 29 MMOL/L (ref 21–32)
CREAT SERPL-MCNC: 0.89 MG/DL (ref 0.5–1.05)
EGFRCR SERPLBLD CKD-EPI 2021: 69 ML/MIN/1.73M*2
ERYTHROCYTE [DISTWIDTH] IN BLOOD BY AUTOMATED COUNT: 12.7 % (ref 11.5–14.5)
GLUCOSE SERPL-MCNC: 106 MG/DL (ref 74–99)
HCT VFR BLD AUTO: 45.7 % (ref 36–46)
HGB BLD-MCNC: 14.7 G/DL (ref 12–16)
MCH RBC QN AUTO: 32.2 PG (ref 26–34)
MCHC RBC AUTO-ENTMCNC: 32.2 G/DL (ref 32–36)
MCV RBC AUTO: 100 FL (ref 80–100)
NRBC BLD-RTO: 0 /100 WBCS (ref 0–0)
PLATELET # BLD AUTO: 174 X10*3/UL (ref 150–450)
POTASSIUM SERPL-SCNC: 5.1 MMOL/L (ref 3.5–5.3)
RBC # BLD AUTO: 4.56 X10*6/UL (ref 4–5.2)
SODIUM SERPL-SCNC: 141 MMOL/L (ref 136–145)
WBC # BLD AUTO: 3.6 X10*3/UL (ref 4.4–11.3)

## 2024-11-05 PROCEDURE — 85027 COMPLETE CBC AUTOMATED: CPT

## 2024-11-05 PROCEDURE — 86850 RBC ANTIBODY SCREEN: CPT

## 2024-11-05 PROCEDURE — 86901 BLOOD TYPING SEROLOGIC RH(D): CPT

## 2024-11-05 PROCEDURE — 36415 COLL VENOUS BLD VENIPUNCTURE: CPT

## 2024-11-05 PROCEDURE — 80048 BASIC METABOLIC PNL TOTAL CA: CPT

## 2024-11-05 PROCEDURE — 86900 BLOOD TYPING SEROLOGIC ABO: CPT

## 2024-11-06 ENCOUNTER — LAB REQUISITION (OUTPATIENT)
Dept: LAB | Facility: HOSPITAL | Age: 74
End: 2024-11-06
Payer: MEDICARE

## 2024-11-06 DIAGNOSIS — N95.0 POSTMENOPAUSAL BLEEDING: ICD-10-CM

## 2024-11-06 LAB
ABO GROUP (TYPE) IN BLOOD: NORMAL
ANTIBODY SCREEN: NORMAL
RH FACTOR (ANTIGEN D): NORMAL

## 2024-11-08 ENCOUNTER — HOSPITAL ENCOUNTER (OUTPATIENT)
Facility: CLINIC | Age: 74
Setting detail: OUTPATIENT SURGERY
Discharge: HOME | End: 2024-11-08
Attending: OBSTETRICS & GYNECOLOGY | Admitting: OBSTETRICS & GYNECOLOGY
Payer: MEDICARE

## 2024-11-08 ENCOUNTER — ANESTHESIA (OUTPATIENT)
Dept: OPERATING ROOM | Facility: CLINIC | Age: 74
End: 2024-11-08
Payer: MEDICARE

## 2024-11-08 VITALS
OXYGEN SATURATION: 98 % | HEIGHT: 67 IN | WEIGHT: 196.21 LBS | HEART RATE: 66 BPM | TEMPERATURE: 97.3 F | BODY MASS INDEX: 30.8 KG/M2 | SYSTOLIC BLOOD PRESSURE: 136 MMHG | RESPIRATION RATE: 16 BRPM | DIASTOLIC BLOOD PRESSURE: 68 MMHG

## 2024-11-08 DIAGNOSIS — N95.0 POSTMENOPAUSAL BLEEDING: Primary | ICD-10-CM

## 2024-11-08 PROCEDURE — 7100000002 HC RECOVERY ROOM TIME - EACH INCREMENTAL 1 MINUTE: Performed by: OBSTETRICS & GYNECOLOGY

## 2024-11-08 PROCEDURE — 3600000008 HC OR TIME - EACH INCREMENTAL 1 MINUTE - PROCEDURE LEVEL THREE: Performed by: OBSTETRICS & GYNECOLOGY

## 2024-11-08 PROCEDURE — 2500000004 HC RX 250 GENERAL PHARMACY W/ HCPCS (ALT 636 FOR OP/ED)

## 2024-11-08 PROCEDURE — 7100000001 HC RECOVERY ROOM TIME - INITIAL BASE CHARGE: Performed by: OBSTETRICS & GYNECOLOGY

## 2024-11-08 PROCEDURE — 2500000005 HC RX 250 GENERAL PHARMACY W/O HCPCS: Performed by: OBSTETRICS & GYNECOLOGY

## 2024-11-08 PROCEDURE — 58558 HYSTEROSCOPY BIOPSY: CPT | Performed by: OBSTETRICS & GYNECOLOGY

## 2024-11-08 PROCEDURE — 3700000002 HC GENERAL ANESTHESIA TIME - EACH INCREMENTAL 1 MINUTE: Performed by: OBSTETRICS & GYNECOLOGY

## 2024-11-08 PROCEDURE — 2720000007 HC OR 272 NO HCPCS: Performed by: OBSTETRICS & GYNECOLOGY

## 2024-11-08 PROCEDURE — 7100000010 HC PHASE TWO TIME - EACH INCREMENTAL 1 MINUTE: Performed by: OBSTETRICS & GYNECOLOGY

## 2024-11-08 PROCEDURE — 7100000009 HC PHASE TWO TIME - INITIAL BASE CHARGE: Performed by: OBSTETRICS & GYNECOLOGY

## 2024-11-08 PROCEDURE — 3700000001 HC GENERAL ANESTHESIA TIME - INITIAL BASE CHARGE: Performed by: OBSTETRICS & GYNECOLOGY

## 2024-11-08 PROCEDURE — 3600000003 HC OR TIME - INITIAL BASE CHARGE - PROCEDURE LEVEL THREE: Performed by: OBSTETRICS & GYNECOLOGY

## 2024-11-08 RX ORDER — FENTANYL CITRATE 50 UG/ML
25 INJECTION, SOLUTION INTRAMUSCULAR; INTRAVENOUS EVERY 5 MIN PRN
Status: DISCONTINUED | OUTPATIENT
Start: 2024-11-08 | End: 2024-11-08 | Stop reason: HOSPADM

## 2024-11-08 RX ORDER — KETOROLAC TROMETHAMINE 30 MG/ML
INJECTION, SOLUTION INTRAMUSCULAR; INTRAVENOUS AS NEEDED
Status: DISCONTINUED | OUTPATIENT
Start: 2024-11-08 | End: 2024-11-08

## 2024-11-08 RX ORDER — PROPOFOL 10 MG/ML
INJECTION, EMULSION INTRAVENOUS AS NEEDED
Status: DISCONTINUED | OUTPATIENT
Start: 2024-11-08 | End: 2024-11-08

## 2024-11-08 RX ORDER — METOCLOPRAMIDE HYDROCHLORIDE 5 MG/ML
10 INJECTION INTRAMUSCULAR; INTRAVENOUS ONCE AS NEEDED
Status: DISCONTINUED | OUTPATIENT
Start: 2024-11-08 | End: 2024-11-08 | Stop reason: HOSPADM

## 2024-11-08 RX ORDER — FENTANYL CITRATE 50 UG/ML
50 INJECTION, SOLUTION INTRAMUSCULAR; INTRAVENOUS EVERY 5 MIN PRN
Status: DISCONTINUED | OUTPATIENT
Start: 2024-11-08 | End: 2024-11-08 | Stop reason: HOSPADM

## 2024-11-08 RX ORDER — LIDOCAINE HYDROCHLORIDE 10 MG/ML
0.1 INJECTION, SOLUTION EPIDURAL; INFILTRATION; INTRACAUDAL; PERINEURAL ONCE
Status: DISCONTINUED | OUTPATIENT
Start: 2024-11-08 | End: 2024-11-08 | Stop reason: HOSPADM

## 2024-11-08 RX ORDER — MIDAZOLAM HYDROCHLORIDE 1 MG/ML
INJECTION, SOLUTION INTRAMUSCULAR; INTRAVENOUS AS NEEDED
Status: DISCONTINUED | OUTPATIENT
Start: 2024-11-08 | End: 2024-11-08

## 2024-11-08 RX ORDER — ONDANSETRON HYDROCHLORIDE 2 MG/ML
4 INJECTION, SOLUTION INTRAVENOUS ONCE AS NEEDED
Status: DISCONTINUED | OUTPATIENT
Start: 2024-11-08 | End: 2024-11-08 | Stop reason: HOSPADM

## 2024-11-08 RX ORDER — SODIUM CHLORIDE 0.9 G/100ML
IRRIGANT IRRIGATION AS NEEDED
Status: DISCONTINUED | OUTPATIENT
Start: 2024-11-08 | End: 2024-11-08 | Stop reason: HOSPADM

## 2024-11-08 RX ORDER — ALBUTEROL SULFATE 0.83 MG/ML
2.5 SOLUTION RESPIRATORY (INHALATION) ONCE AS NEEDED
Status: DISCONTINUED | OUTPATIENT
Start: 2024-11-08 | End: 2024-11-08 | Stop reason: HOSPADM

## 2024-11-08 RX ORDER — FENTANYL CITRATE 50 UG/ML
INJECTION, SOLUTION INTRAMUSCULAR; INTRAVENOUS AS NEEDED
Status: DISCONTINUED | OUTPATIENT
Start: 2024-11-08 | End: 2024-11-08

## 2024-11-08 RX ORDER — ACETAMINOPHEN 325 MG/1
650 TABLET ORAL EVERY 6 HOURS PRN
Qty: 20 TABLET | Refills: 0 | Status: SHIPPED | OUTPATIENT
Start: 2024-11-08 | End: 2024-11-18

## 2024-11-08 RX ORDER — LIDOCAINE HYDROCHLORIDE 20 MG/ML
INJECTION, SOLUTION INFILTRATION; PERINEURAL AS NEEDED
Status: DISCONTINUED | OUTPATIENT
Start: 2024-11-08 | End: 2024-11-08

## 2024-11-08 RX ORDER — LABETALOL HYDROCHLORIDE 5 MG/ML
5 INJECTION, SOLUTION INTRAVENOUS ONCE AS NEEDED
Status: DISCONTINUED | OUTPATIENT
Start: 2024-11-08 | End: 2024-11-08 | Stop reason: HOSPADM

## 2024-11-08 RX ORDER — ACETAMINOPHEN 325 MG/1
650 TABLET ORAL EVERY 4 HOURS PRN
Status: DISCONTINUED | OUTPATIENT
Start: 2024-11-08 | End: 2024-11-08 | Stop reason: HOSPADM

## 2024-11-08 RX ORDER — ONDANSETRON HYDROCHLORIDE 2 MG/ML
INJECTION, SOLUTION INTRAVENOUS AS NEEDED
Status: DISCONTINUED | OUTPATIENT
Start: 2024-11-08 | End: 2024-11-08

## 2024-11-08 RX ORDER — IBUPROFEN 600 MG/1
600 TABLET ORAL EVERY 6 HOURS PRN
Qty: 20 TABLET | Refills: 0 | Status: SHIPPED | OUTPATIENT
Start: 2024-11-08 | End: 2024-11-18

## 2024-11-08 RX ORDER — SODIUM CHLORIDE, SODIUM LACTATE, POTASSIUM CHLORIDE, CALCIUM CHLORIDE 600; 310; 30; 20 MG/100ML; MG/100ML; MG/100ML; MG/100ML
20 INJECTION, SOLUTION INTRAVENOUS CONTINUOUS
Status: DISCONTINUED | OUTPATIENT
Start: 2024-11-08 | End: 2024-11-08 | Stop reason: HOSPADM

## 2024-11-08 ASSESSMENT — PAIN SCALES - GENERAL
PAINLEVEL_OUTOF10: 0 - NO PAIN
PAIN_LEVEL: 0
PAINLEVEL_OUTOF10: 0 - NO PAIN

## 2024-11-08 ASSESSMENT — PAIN - FUNCTIONAL ASSESSMENT
PAIN_FUNCTIONAL_ASSESSMENT: 0-10

## 2024-11-08 ASSESSMENT — COLUMBIA-SUICIDE SEVERITY RATING SCALE - C-SSRS
2. HAVE YOU ACTUALLY HAD ANY THOUGHTS OF KILLING YOURSELF?: NO
6. HAVE YOU EVER DONE ANYTHING, STARTED TO DO ANYTHING, OR PREPARED TO DO ANYTHING TO END YOUR LIFE?: NO
1. IN THE PAST MONTH, HAVE YOU WISHED YOU WERE DEAD OR WISHED YOU COULD GO TO SLEEP AND NOT WAKE UP?: NO

## 2024-11-08 NOTE — OP NOTE
"D  and  C Hysteroscopy Laparoscopy Operative Note     Date: 2024  OR Location: [facility] OR    Name: Lashawn Bear \"Leona\", : 1950, Age: 74 y.o., MRN: [id number], Sex: female    Diagnosis  Pre-op Diagnosis      * Postmenopausal bleeding [N95.0] Post-op Diagnosis     * Postmenopausal bleeding [N95.0]     Procedures  D  and  C Hysteroscopy Laparoscopy  20714 - UT HYSTEROSCOPY BX ENDOMETRIUM&/POLYPC W/WO D&C      Surgeons      * Buffy Busby - Primary    Staff:   Circulator: Socorro Arango Person: Kareen    Anesthesia Staff: Anesthesiologist: Amanuel Miranda MD  CRNA: PATRICE Quijano-SHARI  SRNA: [facility]    Procedure Summary  Anesthesia: General  ASA: III  Estimated Blood Loss: 0 mL  Intra-op Medications:   Administrations occurring from 1230 to 1330 on 24:   Medication Name Total Dose   sodium chloride 0.9 % irrigation solution 250 mL   surgical lubricant gel 1 Application   dexAMETHasone (Decadron) injection 4 mg/mL 10 mg   fentaNYL PF 0.05 mg/mL 50 mcg   ketorolac (Toradol) 30 mg 10 mg   LR bolus Cannot be calculated   lidocaine (Xylocaine) 2 % 70 mL   midazolam (Versed) 1 mg/1 mL 1 mg   ondansetron (Zofran) 2 mg/mL injection 4 mg   propofol (Diprivan) injection 10 mg/mL 170 mg              Anesthesia Record               Intraprocedure I/O Totals          Intake    LR bolus 400.00 mL    Total Intake 400 mL          Specimen:   ID Type Source Tests Collected by Time   1 : ENDOMETRIUM POLYPECTOMY Tissue ENDOMETRIUM POLYPECTOMY SURGICAL PATHOLOGY EXAM Buffy Busby MD 2024 1307         Drains and/or Catheters: * None in log *    Findings: Multiple small uterine polyps    Indications: Lashawn Bear \"Leona\" is an 74 y.o. female who is having surgery for Postmenopausal bleeding [N95.0].     The patient was seen in the preoperative area. The risks, benefits, complications, treatment options, non-operative alternatives, expected recovery and outcomes were discussed " with the patient. The possibilities of reaction to medication, pulmonary aspiration, injury to surrounding structures, bleeding, recurrent infection, the need for additional procedures, failure to diagnose a condition, and creating a complication requiring transfusion or operation were discussed with the patient. The patient concurred with the proposed plan, giving informed consent.  The site of surgery was properly noted/marked if necessary per policy. The patient has been actively warmed in preoperative area. Preoperative antibiotics are not indicated. Venous thrombosis prophylaxis have been ordered including bilateral sequential compression devices    Procedure Details: Patient was taken to the OR and placed under general anesthesia. Legs were elevated in Raphael stirrups with SCDS in placed. Patient was prepped and draped in the usual sterile manner. A speculum was placed into the vagina and the cervix was grasped with a single tooth tenaculum. The cervix was dilated to 17 Cambodian. The Aveta hysteroscope was introduced with finding of multiple small uterine polyps. The ostia were identified. The Flex resector was introduced and the polyps were resected. Sampling was done from all uterine walls. Procedure was then terminated, instruments were all removed. Cervix was noted to be hemostatic. Fluid deficit 170 mls.   Patient was then awoken from anesthesia and transferred to recovery in stable condition.     Complications:  None; patient tolerated the procedure well.    Disposition: PACU - hemodynamically stable.  Condition: stable       Attending Attestation: I performed the procedure.    Buffy Busby  Phone Number: [phone]

## 2024-11-08 NOTE — ANESTHESIA POSTPROCEDURE EVALUATION
"Patient: Lashawn Bear \"Leona\"    Procedure Summary       Date: 11/08/24 Room / Location: [facility] OR 04 / Virtual [facility] OR    Anesthesia Start: 1231 Anesthesia Stop: 1313    Procedure: D  and  C Hysteroscopy Laparoscopy Diagnosis:       Postmenopausal bleeding      (Postmenopausal bleeding [N95.0])    Surgeons: Buffy Busby MD Responsible Provider: Amanuel Miranda MD    Anesthesia Type: general ASA Status: 3            Anesthesia Type: general    Vitals Value Taken Time   /72 11/08/24 1324   Temp 36.2 °C (97.2 °F) 11/08/24 1309   Pulse 71 11/08/24 1324   Resp 16 11/08/24 1324   SpO2 96 % 11/08/24 1324       Anesthesia Post Evaluation    Patient location during evaluation: PACU  Patient participation: complete - patient participated  Level of consciousness: awake  Pain score: 0  Pain management: adequate  Airway patency: patent  Cardiovascular status: acceptable  Respiratory status: acceptable  Hydration status: acceptable  Postoperative Nausea and Vomiting: none    There were no known notable events for this encounter.    "

## 2024-11-08 NOTE — H&P
"History Of Present Illness  Lashawn Bear \"Leona\" is a 74 y.o. female presenting with postmenopausal bleeding and ultrasound showing a 7 mm endometrial stripe. As we had issues getting an endometrial biopsy for her in the past, patient opted to proceed directly to hysteroscopy.      Past Medical History  Past Medical History:   Diagnosis Date    Acute vaginitis     Vaginal infection    Basal cell carcinoma of skin of other part of trunk     Basal cell carcinoma of back    Calculus of gallbladder with acute cholecystitis without obstruction 2021    Acute calculous cholecystitis    Combined forms of age-related cataract, right eye 10/21/2021    Combined forms of age-related cataract of right eye    Menopausal and female climacteric states     Menopausal symptoms    Myopia, bilateral 2021    Bilateral myopia    Other conditions influencing health status 2021    Small bowel perforation, intraoperative    Personal history of other diseases of the female genital tract     History of breast disorder    Personal history of other diseases of the female genital tract 2020    History of postmenopausal bleeding    Personal history of other medical treatment     History of mammogram    Personal history of other medical treatment 2019    History of screening mammography    Personal history of other specified conditions 2020    No post-op complications    Presbyopia 10/21/2021    Presbyopia OU    Puckering of macula, left eye 10/21/2021    Macular puckering of retina, left eye    Unspecified astigmatism, bilateral 10/21/2021    Astigmatism, bilateral    Unspecified cataract 2021    Cataract of left eye       Surgical History  Past Surgical History:   Procedure Laterality Date    APPENDECTOMY  2014    Appendectomy    BREAST BIOPSY  2014    Biopsy Breast Open     SECTION, CLASSIC  2014     Section    OOPHORECTOMY  2014    Oophorectomy - " "Unilateral (Removal Of One Ovary)    OTHER SURGICAL HISTORY  08/31/2020    Dilation and curettage    OTHER SURGICAL HISTORY  04/16/2019    Mohs surgery    OTHER SURGICAL HISTORY  07/30/2018    Biopsy Skin    OTHER SURGICAL HISTORY  08/31/2021    Cataract surgery    OTHER SURGICAL HISTORY  07/07/2021    Small bowel resection    OTHER SURGICAL HISTORY  07/07/2021    Cholecystectomy laparoscopic    TONSILLECTOMY  02/28/2014    Tonsillectomy        Social History  She reports that she has quit smoking. Her smoking use included cigarettes. She has never used smokeless tobacco. She reports current alcohol use of about 7.0 standard drinks of alcohol per week. She reports that she does not use drugs.    Family History  Family History   Problem Relation Name Age of Onset    Basal cell carcinoma Mother      Other (CARDIAC DISORDER) Mother      Diabetes Father      Alzheimer's disease Father      Breast cancer Sister  65    Seizures Sister      Other (TRAMATIC BRAIN INJURY) Sister          Allergies  Patient has no known allergies.    Review of Systems   All other systems reviewed and are negative.       Physical Exam  Vitals reviewed.   Constitutional:       Appearance: Normal appearance.   HENT:      Head: Normocephalic and atraumatic.   Cardiovascular:      Rate and Rhythm: Normal rate and regular rhythm.   Pulmonary:      Effort: Pulmonary effort is normal.   Abdominal:      General: Abdomen is flat.      Palpations: Abdomen is soft.   Musculoskeletal:         General: Normal range of motion.      Cervical back: Normal range of motion.   Neurological:      General: No focal deficit present.      Mental Status: She is alert and oriented to person, place, and time.   Psychiatric:         Mood and Affect: Mood normal.         Thought Content: Thought content normal.          Last Recorded Vitals  Blood pressure 126/67, pulse 73, temperature 36.8 °C (98.2 °F), temperature source Temporal, resp. rate 16, height 1.702 m (5' 7\"), " weight 89 kg (196 lb 3.4 oz), SpO2 98%.      Assessment/Plan   Assessment & Plan  Postmenopausal bleeding      For hysteroscopy with endometrial sampling.          Buffy Busby MD

## 2024-11-08 NOTE — ANESTHESIA PROCEDURE NOTES
Airway  Date/Time: 11/8/2024 12:38 PM  Urgency: elective    Airway not difficult    Staffing  Performed: SRNA   Authorized by: Amanuel Miranda MD    Performed by: Shanna Bailey  Patient location during procedure: OR    Indications and Patient Condition  Indications for airway management: anesthesia  Spontaneous ventilation: present  Sedation level: deep  Preoxygenated: yes  Patient position: sniffing  MILS maintained throughout  Mask difficulty assessment: 1 - vent by mask  Planned trial extubation    Final Airway Details  Final airway type: supraglottic airway (i gel 4)      Successful airway: Supraglottic airway: i gel 4.  Size 4     Number of attempts at approach: 1

## 2024-11-08 NOTE — ANESTHESIA PREPROCEDURE EVALUATION
"Patient: Lashawn Bear \"Leona\"    Procedure Information       Date/Time: 11/08/24 1230    Procedure: D  and  C Hysteroscopy Laparoscopy    Location: [facility] OR 04 / Virtual [facility] OR    Surgeons: Buffy Busby MD            Relevant Problems   Cardiac   (+) Hyperlipidemia      Skin   (+) Basal cell carcinoma   (+) Basal cell carcinoma of skin of scalp and neck       Clinical information reviewed:   Tobacco  Allergies  Meds  Problems  Med Hx  Surg Hx   Fam Hx          NPO Detail:  No data recorded     Physical Exam    Airway  Mallampati: III  TM distance: >3 FB     Cardiovascular   Rhythm: regular  Rate: normal     Dental    Pulmonary   Breath sounds clear to auscultation     Abdominal   Abdomen: soft           Anesthesia Plan    History of general anesthesia?: yes  History of complications of general anesthesia?: no    ASA 3     general     intravenous induction   Trial extubation is planned.  Anesthetic plan and risks discussed with patient.    Plan discussed with CRNA and CAA.      "

## 2024-11-15 LAB
LABORATORY COMMENT REPORT: NORMAL
PATH REPORT.COMMENTS IMP SPEC: NORMAL
PATH REPORT.FINAL DX SPEC: NORMAL
PATH REPORT.GROSS SPEC: NORMAL
PATH REPORT.RELEVANT HX SPEC: NORMAL
PATH REPORT.TOTAL CANCER: NORMAL

## 2024-11-18 ENCOUNTER — TELEPHONE (OUTPATIENT)
Dept: OBSTETRICS AND GYNECOLOGY | Facility: HOSPITAL | Age: 74
End: 2024-11-18
Payer: MEDICARE

## 2024-11-18 DIAGNOSIS — C54.1 ENDOMETRIOID CARCINOMA OF ENDOMETRIUM (MULTI): Primary | ICD-10-CM

## 2024-11-18 NOTE — TELEPHONE ENCOUNTER
Patient aware that referral for gyn onc has been placed  Phone number given to patient to schedule appointment  Encouraged patient to call office with any questions or concerns  Sophia House RN

## 2024-11-18 NOTE — TELEPHONE ENCOUNTER
----- Message from Buffy Busby sent at 11/18/2024  8:17 AM EST -----  I spoke with patient over the weekend. Please refer to gyn onc.     Informed patient that we will be placing a referral for her to see the GYN/ONC providers  Referral pended to provider for signing, once placed I will notify patient and help her to get scheduled  Sophia House RN    Well appearing, awake, alert, oriented to person, place, time/situation and in no apparent distress. normal...

## 2024-11-26 ENCOUNTER — OFFICE VISIT (OUTPATIENT)
Dept: OBSTETRICS AND GYNECOLOGY | Facility: HOSPITAL | Age: 74
End: 2024-11-26
Payer: MEDICARE

## 2024-11-26 VITALS
DIASTOLIC BLOOD PRESSURE: 82 MMHG | WEIGHT: 192 LBS | HEIGHT: 67 IN | SYSTOLIC BLOOD PRESSURE: 131 MMHG | BODY MASS INDEX: 30.13 KG/M2 | HEART RATE: 111 BPM

## 2024-11-26 DIAGNOSIS — Z48.89 POSTOPERATIVE VISIT: Primary | ICD-10-CM

## 2024-11-26 PROCEDURE — 1157F ADVNC CARE PLAN IN RCRD: CPT | Performed by: OBSTETRICS & GYNECOLOGY

## 2024-11-26 PROCEDURE — 99211 OFF/OP EST MAY X REQ PHY/QHP: CPT | Performed by: OBSTETRICS & GYNECOLOGY

## 2024-11-26 PROCEDURE — 1036F TOBACCO NON-USER: CPT | Performed by: OBSTETRICS & GYNECOLOGY

## 2024-11-26 PROCEDURE — 1160F RVW MEDS BY RX/DR IN RCRD: CPT | Performed by: OBSTETRICS & GYNECOLOGY

## 2024-11-26 PROCEDURE — 3008F BODY MASS INDEX DOCD: CPT | Performed by: OBSTETRICS & GYNECOLOGY

## 2024-11-26 PROCEDURE — 1159F MED LIST DOCD IN RCRD: CPT | Performed by: OBSTETRICS & GYNECOLOGY

## 2024-11-26 NOTE — PROGRESS NOTES
"Subjective   Patient ID: Lashawn Bear \"Leona\" is a 74 y.o. female who presents for No chief complaint on file..  S/p hysteroscopy on 11/8. 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 Referred to gyn oncology- sees Dr. Shelton 12/16.  Bled for a few days after, has stopped. No pain.   Discussed what to expect.     Review of Systems   All other systems reviewed and are negative.    Objective   Physical Exam  Constitutional:       Appearance: Normal appearance.   HENT:      Head: Normocephalic and atraumatic.   Genitourinary:     General: Normal vulva.      Exam position: Lithotomy position.      Vagina: Normal.      Cervix: Normal.      Uterus: Normal.       Adnexa: Right adnexa normal and left adnexa normal.   Musculoskeletal:         General: Normal range of motion.   Skin:     General: Skin is warm and dry.   Neurological:      General: No focal deficit present.      Mental Status: She is alert and oriented to person, place, and time.   Psychiatric:         Mood and Affect: Mood normal.         Behavior: Behavior normal.     Assessment/Plan   Problem List Items Addressed This Visit    None  Visit Diagnoses         Codes    Postoperative visit    -  Primary Z48.89        Doing well. Has follow-up with oncology.        Buffy Busby MD 11/26/24 7:51 AM   "

## 2024-12-16 ENCOUNTER — OFFICE VISIT (OUTPATIENT)
Dept: GYNECOLOGIC ONCOLOGY | Facility: CLINIC | Age: 74
End: 2024-12-16
Payer: MEDICARE

## 2024-12-16 VITALS
WEIGHT: 194 LBS | DIASTOLIC BLOOD PRESSURE: 76 MMHG | RESPIRATION RATE: 16 BRPM | TEMPERATURE: 98.2 F | BODY MASS INDEX: 31.18 KG/M2 | HEIGHT: 66 IN | HEART RATE: 70 BPM | SYSTOLIC BLOOD PRESSURE: 137 MMHG | OXYGEN SATURATION: 97 %

## 2024-12-16 DIAGNOSIS — C54.1 ENDOMETRIOID CARCINOMA OF ENDOMETRIUM (MULTI): ICD-10-CM

## 2024-12-16 PROCEDURE — 99214 OFFICE O/P EST MOD 30 MIN: CPT | Performed by: OBSTETRICS & GYNECOLOGY

## 2024-12-16 PROCEDURE — 1159F MED LIST DOCD IN RCRD: CPT | Performed by: OBSTETRICS & GYNECOLOGY

## 2024-12-16 PROCEDURE — 1160F RVW MEDS BY RX/DR IN RCRD: CPT | Performed by: OBSTETRICS & GYNECOLOGY

## 2024-12-16 PROCEDURE — 1126F AMNT PAIN NOTED NONE PRSNT: CPT | Performed by: OBSTETRICS & GYNECOLOGY

## 2024-12-16 PROCEDURE — 1036F TOBACCO NON-USER: CPT | Performed by: OBSTETRICS & GYNECOLOGY

## 2024-12-16 PROCEDURE — 3008F BODY MASS INDEX DOCD: CPT | Performed by: OBSTETRICS & GYNECOLOGY

## 2024-12-16 PROCEDURE — 1157F ADVNC CARE PLAN IN RCRD: CPT | Performed by: OBSTETRICS & GYNECOLOGY

## 2024-12-16 ASSESSMENT — PAIN SCALES - GENERAL: PAINLEVEL_OUTOF10: 0-NO PAIN

## 2024-12-16 NOTE — PROGRESS NOTES
Patient ID: Leona Bear is a 74 y.o. female.  Referring Physician: Buffy Busby MD  [id number]  [place] [address]  Primary Care Provider: Montse Lock MD      Subjective    75 yo with uterine cancer    Reports vaginal bleeding in  that stopped and restarted 2024. Denies pain.    Ob/Gyn hx   C section x2  Menopause at age 43    PMH  Hypercholesterolemia    PSH  D&C  Cholecystectomy with colon resection  Appendectomy  Tubal ligation  Removal of L ovarian cyst    SH  Lives with her  and is a retired RN at .  Denies illicit drug use. Occasional smoker and alcohol use.    FH  Sister Hx of breast cancer  Otherwise no family history of breast, uterine, ovarian or colon cancer.             Review of Systems   All other systems reviewed and are negative.       Objective   BSA: 2.04 meters squared  /76 (BP Location: Right arm, Patient Position: Sitting, BP Cuff Size: Adult)   Pulse 70   Temp 36.8 °C (98.2 °F) (Temporal)   Resp 16   Wt 88 kg (194 lb 0.1 oz)   SpO2 97%   BMI 30.39 kg/m²      Family History   Problem Relation Name Age of Onset    Basal cell carcinoma Mother      Other (CARDIAC DISORDER) Mother      Diabetes Father      Alzheimer's disease Father      Breast cancer Sister  65    Seizures Sister      Other (TRAMATIC BRAIN INJURY) Sister         Lashawn Bear  reports that she has quit smoking. Her smoking use included cigarettes. She has never used smokeless tobacco.  She  reports current alcohol use of about 7.0 standard drinks of alcohol per week.  She  reports no history of drug use.    Physical Exam  Constitutional:       General: She is not in acute distress.     Appearance: Normal appearance.   Cardiovascular:      Rate and Rhythm: Normal rate and regular rhythm.   Pulmonary:      Effort: Pulmonary effort is normal.      Breath sounds: Normal breath sounds.   Abdominal:      General: Bowel sounds are normal. There is no  distension.   Genitourinary:     General: Normal vulva.      Vagina: Normal.      Cervix: Normal.      Uterus: Normal.       Adnexa: Right adnexa normal and left adnexa normal.      Comments: Pelvic exam: No obvious masses on bimanual or rectovaginal exam.      Musculoskeletal:      Right forearm: Normal.      Left forearm: Normal.      Right hand: Normal.      Left hand: Normal.      Right lower leg: Normal.      Left lower leg: Normal.      Right foot: Normal.      Left foot: Normal.         Performance Status:  Symptomatic; fully ambulatory    Assessment/Plan      Oncology History Overview Note   EMB 11/8/24 atypical hyperplasia bordering on G1 endometrioid carcinoma  US pelvis - uterus normal size, EMS 7mm       Endometrioid carcinoma of endometrium (Multi)   12/16/2024 Initial Diagnosis    Endometrioid carcinoma of endometrium (Multi)            Problem List Items Addressed This Visit             ICD-10-CM    Endometrioid carcinoma of endometrium (Multi) C54.1    Relevant Orders    Case Request Operating Room: total laparoscopic hysterectomy, bilateral salpingo-oophorectomy, sentinel lymph node dissection (Completed)       Treatment Plans       No treatment plans exist        Reviewed natural history and prognosis of atypical endometrial hyperplasia and concurrent risk for endometrial cancer.  Discussed recommendation for laparoscopic hysterectomy bilateral salpingo-oophorectomy sentinel lymph node dissection.  Risks of surgery reviewed.  She will need preadmission testing before surgery      Scribe Attestation  By signing my name below, IJuliana Scribe   attest that this documentation has been prepared under the direction and in the presence of Cara Shelton MD.

## 2024-12-17 DIAGNOSIS — C54.1 ENDOMETRIOID CARCINOMA OF ENDOMETRIUM (MULTI): Primary | ICD-10-CM

## 2024-12-18 ENCOUNTER — TELEPHONE (OUTPATIENT)
Dept: GYNECOLOGIC ONCOLOGY | Facility: HOSPITAL | Age: 74
End: 2024-12-18
Payer: MEDICARE

## 2024-12-18 NOTE — TELEPHONE ENCOUNTER
Phoned patient to notify that Dr. Shelton recommends CPM appointment in preparation for January TLH/BSO procedure as this is a major procedure.    Patient verbalized her understanding of information given and in agreement with recommendation.    Messaged CPM to update.

## 2024-12-24 ENCOUNTER — APPOINTMENT (OUTPATIENT)
Dept: PREADMISSION TESTING | Facility: HOSPITAL | Age: 74
End: 2024-12-24
Payer: MEDICARE

## 2024-12-26 ENCOUNTER — CLINICAL SUPPORT (OUTPATIENT)
Dept: PREADMISSION TESTING | Facility: HOSPITAL | Age: 74
End: 2024-12-26
Payer: MEDICARE

## 2024-12-26 ENCOUNTER — SOCIAL WORK (OUTPATIENT)
Dept: HEMATOLOGY/ONCOLOGY | Facility: CLINIC | Age: 74
End: 2024-12-26

## 2024-12-26 RX ORDER — ASPIRIN 325 MG
325 TABLET ORAL EVERY 4 HOURS PRN
COMMUNITY

## 2024-12-26 NOTE — PROGRESS NOTES
Patient was seen in Dr. Shelton's clinic on 12/16/24.  Patient has newly diagnosed endometrioid carcinoma.  LSW mailed the following letter to introduce social work services.  LSW will remain available for any psychosocial needs that may arise.     December 26, 2024  Dear Lashawn,  I hope this letter finds you well. As part of [facility]'s commitment to providing comprehensive care, I wanted to introduce myself as the social work on your care team. Our team goal is to support you and your loved ones throughout your journey, addressing not only your medical needs but also your emotional, practical, and social concerns.  My name is Enedelia Oleary and I am a licensed oncology social worker working within Dr. Shelton's office.  I am available to assist you in a variety of ways, including:  Emotional Support: Offering a safe space to discuss your feelings and coping strategies.  Resource Navigation: Connecting you with financial assistance programs, local transportation resources, and other community resources.  Family Support: Helping your loved ones understand and navigate the challenges of your diagnosis.  Advanced Care Planning: Assisting with decisions about care preferences and advance directives.  These services are provided at no additional cost to you as part of your care at [facility]. I encourage you to reach out and take advantage of this support whenever needed.  If you have questions or if you would like to set up a time to talk, please feel free to reach out to me at [phone] or email me at [email or web address]. My role here is to support you and your family every step of the way.  Your strength and resilience are truly inspiring, and I am honored to be part of your care team. Please don't hesitate to let me know how I can help.  Warm regards,        Enedelia Oleary, VALERIE, LS  Oncology Social Work  [place]  [facility]  [phone]

## 2024-12-26 NOTE — CPM/PAT H&P
"CPM/PAT Evaluation       Name: Lashawn Bear (Lashawn Bear \"Leona\")  /Age: 1950/74 y.o.     {[facility] Visit Type:86270}      Chief Complaint: ***    HPI    Lashawn Bear is scheduled for total laparoscopic hysterectomy, bilateral salpingo-oophorectomy, sentinel lymph node dissection - Bilateral on 24    **Data Input  Past Medical History:   Diagnosis Date    Acute vaginitis     Vaginal infection    Basal cell carcinoma of skin of other part of trunk     Basal cell carcinoma of back    Calculus of gallbladder with acute cholecystitis without obstruction 2021    Acute calculous cholecystitis    Combined forms of age-related cataract, right eye 10/21/2021    Combined forms of age-related cataract of right eye    Hyperlipidemia     Menopausal and female climacteric states     Menopausal symptoms    Myopia, bilateral 2021    Bilateral myopia    Other conditions influencing health status 2021    Small bowel perforation, intraoperative    Personal history of other diseases of the female genital tract     History of breast disorder    Personal history of other diseases of the female genital tract 2020    History of postmenopausal bleeding    Personal history of other medical treatment 2019    History of screening mammography    Puckering of macula, left eye 10/21/2021    Macular puckering of retina, left eye    Unspecified astigmatism, bilateral 10/21/2021    Astigmatism, bilateral    Unspecified cataract 2021    Cataract of left eye       Past Surgical History:   Procedure Laterality Date    APPENDECTOMY  2014    Appendectomy    BREAST BIOPSY  2014    Biopsy Breast Open    CATARACT EXTRACTION       SECTION, CLASSIC  2014     Section     SECTION, LOW TRANSVERSE      CHOLECYSTECTOMY      DILATION AND CURETTAGE OF UTERUS      OOPHORECTOMY  2014    Oophorectomy - Unilateral (Removal Of One Ovary)    OTHER SURGICAL " HISTORY  2019    Mohs surgery    OTHER SURGICAL HISTORY  2018    Biopsy Skin    OTHER SURGICAL HISTORY  2021    Small bowel resection    TONSILLECTOMY  2014    Tonsillectomy       Patient  has no history on file for sexual activity.    Family History   Problem Relation Name Age of Onset    Basal cell carcinoma Mother      Other (CARDIAC DISORDER) Mother      Diabetes Father      Alzheimer's disease Father      Breast cancer Sister  65    Seizures Sister      Other (TRAMATIC BRAIN INJURY) Sister         No Known Allergies    Prior to Admission medications    Medication Sig Start Date End Date Taking? Authorizing Provider   rosuvastatin (Crestor) 10 mg tablet Take 1 tablet (10 mg) by mouth once daily. 24  MD FRED Mathews Physical Exam     Airway        Testing/Diagnostic:         - EK24  Sinus rhythm  Normal ECG      - Echo: 21  CONCLUSIONS:   1. The left ventricular systolic function is normal with a 60-65% estimated ejection fraction.   2. RVSP within normal limits.       - CT Chest: 21  IMPRESSION:  1. Aneurysmal ascending and ectatic descending segments of the  thoracic aorta.  2. No aortic dissection or intramural hematoma. No pulmonary embolus.  3. Distended gallbladder with gallstones without gallbladder wall  thickening or surrounding fluid, equivocal for acute cholecystitis.      - CT Calcium scorin20  IMPRESSION:  1. Elevated coronary artery calcium score of 154.35*.  2. Ascending aorta is aneurysmal measuring 4.0 cm. Recommend cardiac  MRI and MR angiography for evaluation of aortic valve morphology and  ascending aorta.       - US Pelvis transabdominal: 24  IMPRESSION:  Nonvisualization of the ovaries.      The endometrium measures 7 mm in thickness. There is no uterine mass.  No sign of free pelvic fluid.        Patient Specialist/PCP:         PCP: Montse Lock 25 presents for a Medicare Wellness  "visit.     GynOnc: Cara Shelton 12/16/24 presents with uterine cancer, Reports vaginal bleeding in 2020 that stopped and restarted 8/2024. Denies pain.       Ob/Gyn: Buffy Busby 11/26/24 post op visit, S/p hysteroscopy on 11/8. Pathology shows \"Atypical endometrial hyperplasia with metaplastic changes bordering endometrioid carcinoma (FIGO grade 1)\". Referred to gyn oncology- sees Dr. Shelton 12/16.         ________________________________________________________  Medication instructions:   Instructed to hold Vitamins, Supplements and Ibuprofen 7 days prior to surgery            Melody Leiva LPN  Preadmission Testing            Visit Vitals  OB Status Postmenopausal   Smoking Status Former       DASI Risk Score      Flowsheet Row Questionnaire Series Submission from 12/17/2024 in Virtual Care with Generic Provider [place] Questionnaire Series Submission from 10/2/2024 in Virtual Care with Generic Provider Hetalt   Can you take care of yourself (eat, dress, bathe, or use toilet)?  2.75  filed at 12/17/2024 1111 2.75  filed at 10/02/2024 1449   Can you walk indoors, such as around your house? 1.75  filed at 12/17/2024 1111 1.75  filed at 10/02/2024 1449   Can you walk a block or two on level ground?  2.75  filed at 12/17/2024 1111 2.75  filed at 10/02/2024 1449   Can you climb a flight of stairs or walk up a hill? 5.5  filed at 12/17/2024 1111 5.5  filed at 10/02/2024 1449   Can you run a short distance? 8  filed at 12/17/2024 1111 8  filed at 10/02/2024 1449   Can you do light work around the house like dusting or washing dishes? 2.7  filed at 12/17/2024 1111 2.7  filed at 10/02/2024 1449   Can you do moderate work around the house like vacuuming, sweeping floors or carrying groceries? 3.5  filed at 12/17/2024 1111 3.5  filed at 10/02/2024 1449   Can you do heavy work around the house like scrubbing floors or lifting and moving heavy furniture?  8  filed at 12/17/2024 1111 8  filed at 10/02/2024 1449 "   Can you do yard work like raking leaves, weeding or pushing a mower? 4.5  filed at 12/17/2024 1111 4.5  filed at 10/02/2024 1449   Can you have sexual relations? 5.25  filed at 12/17/2024 1111 5.25  filed at 10/02/2024 1449   Can you participate in moderate recreational activities like golf, bowling, dancing, doubles tennis or throwing a baseball or football? 6  filed at 12/17/2024 1111 6  filed at 10/02/2024 1449   Can you participate in strenous sports like swimming, singles tennis, football, basketball, or skiing? 0  filed at 12/17/2024 1111 0  filed at 10/02/2024 1449   DASI SCORE 50.7  filed at 12/17/2024 1111 50.7  filed at 10/02/2024 1449   METS Score (Will be calculated only when all the questions are answered) 9  filed at 12/17/2024 1111 9  filed at 10/02/2024 1449          Caprini DVT Assessment    No data to display       Modified Frailty Index    No data to display       CHADS2 Stroke Risk  Current as of about an hour ago        N/A 3 to 100%: High Risk   2 to < 3%: Medium Risk   0 to < 2%: Low Risk     Last Change: N/A          This score determines the patient's risk of having a stroke if the patient has atrial fibrillation.        This score is not applicable to this patient. Components are not calculated.          Revised Cardiac Risk Index    No data to display       Apfel Simplified Score    No data to display       Risk Analysis Index Results This Encounter    No data found in the last 10 encounters.       Stop Bang Score      Flowsheet Row Questionnaire Series Submission from 12/17/2024 in Virtual Care with Generic Provider Partigihart Questionnaire Series Submission from 10/2/2024 in Virtual Care with Generic Provider Partigihart   Do you snore loudly? 0  filed at 12/17/2024 1111 0  filed at 10/02/2024 1449   Do you often feel tired or fatigued after your sleep? 0  filed at 12/17/2024 1111 0  filed at 10/02/2024 1449   Has anyone ever observed you stop breathing in your sleep? 0  filed at 12/17/2024  1111 0  filed at 10/02/2024 1449   Do you have or are you being treated for high blood pressure? 0  filed at 12/17/2024 1111 0  filed at 10/02/2024 1449   Recent BMI (Calculated) 31.1  filed at 12/17/2024 1111 30.9  filed at 10/02/2024 1449   Is BMI greater than 35 kg/m2? 0=No  filed at 12/17/2024 1111 0=No  filed at 10/02/2024 1449   Age older than 50 years old? 1=Yes  filed at 12/17/2024 1111 1=Yes  filed at 10/02/2024 1449   Gender - Male 0=No  filed at 12/17/2024 1111 0=No  filed at 10/02/2024 1449          Prodigy: High Risk  Total Score: 12              Prodigy Age Score           ARISCAT Score for Postoperative Pulmonary Complications    No data to display       Ryan Perioperative Risk for Myocardial Infarction or Cardiac Arrest (ANSHU)    No data to display         Assessment and Plan:     {[facility] EMBEDDED ASSESSMENT AND PLAN:06301}

## 2024-12-27 ENCOUNTER — HOSPITAL ENCOUNTER (OUTPATIENT)
Dept: RADIOLOGY | Facility: CLINIC | Age: 74
Discharge: HOME | End: 2024-12-27
Payer: MEDICARE

## 2024-12-27 VITALS — BODY MASS INDEX: 31.18 KG/M2 | HEIGHT: 66 IN | WEIGHT: 194 LBS

## 2024-12-27 DIAGNOSIS — Z12.31 ENCOUNTER FOR SCREENING MAMMOGRAM FOR BREAST CANCER: ICD-10-CM

## 2024-12-27 PROCEDURE — 77067 SCR MAMMO BI INCL CAD: CPT

## 2025-01-02 ENCOUNTER — PRE-ADMISSION TESTING (OUTPATIENT)
Dept: PREADMISSION TESTING | Facility: HOSPITAL | Age: 75
End: 2025-01-02
Payer: MEDICARE

## 2025-01-02 VITALS
SYSTOLIC BLOOD PRESSURE: 145 MMHG | WEIGHT: 194.2 LBS | BODY MASS INDEX: 30.48 KG/M2 | HEIGHT: 67 IN | HEART RATE: 79 BPM | DIASTOLIC BLOOD PRESSURE: 76 MMHG | TEMPERATURE: 97.7 F

## 2025-01-02 DIAGNOSIS — R92.8 ABNORMAL SCREENING MAMMOGRAM: Primary | ICD-10-CM

## 2025-01-02 DIAGNOSIS — C54.1 ENDOMETRIOID CARCINOMA OF ENDOMETRIUM (MULTI): ICD-10-CM

## 2025-01-02 LAB
ABO GROUP (TYPE) IN BLOOD: NORMAL
ANION GAP SERPL CALC-SCNC: 13 MMOL/L (ref 10–20)
ANTIBODY SCREEN: NORMAL
BUN SERPL-MCNC: 12 MG/DL (ref 6–23)
CALCIUM SERPL-MCNC: 9.4 MG/DL (ref 8.6–10.6)
CHLORIDE SERPL-SCNC: 105 MMOL/L (ref 98–107)
CO2 SERPL-SCNC: 28 MMOL/L (ref 21–32)
CREAT SERPL-MCNC: 0.85 MG/DL (ref 0.5–1.05)
EGFRCR SERPLBLD CKD-EPI 2021: 72 ML/MIN/1.73M*2
ERYTHROCYTE [DISTWIDTH] IN BLOOD BY AUTOMATED COUNT: 12.8 % (ref 11.5–14.5)
GLUCOSE SERPL-MCNC: 101 MG/DL (ref 74–99)
HCT VFR BLD AUTO: 45.3 % (ref 36–46)
HGB BLD-MCNC: 14.9 G/DL (ref 12–16)
MCH RBC QN AUTO: 32.5 PG (ref 26–34)
MCHC RBC AUTO-ENTMCNC: 32.9 G/DL (ref 32–36)
MCV RBC AUTO: 99 FL (ref 80–100)
NRBC BLD-RTO: 0 /100 WBCS (ref 0–0)
PLATELET # BLD AUTO: 172 X10*3/UL (ref 150–450)
POTASSIUM SERPL-SCNC: 4.9 MMOL/L (ref 3.5–5.3)
RBC # BLD AUTO: 4.58 X10*6/UL (ref 4–5.2)
RH FACTOR (ANTIGEN D): NORMAL
SODIUM SERPL-SCNC: 141 MMOL/L (ref 136–145)
WBC # BLD AUTO: 5.6 X10*3/UL (ref 4.4–11.3)

## 2025-01-02 PROCEDURE — 86901 BLOOD TYPING SEROLOGIC RH(D): CPT

## 2025-01-02 PROCEDURE — 82374 ASSAY BLOOD CARBON DIOXIDE: CPT

## 2025-01-02 PROCEDURE — 99204 OFFICE O/P NEW MOD 45 MIN: CPT | Performed by: NURSE PRACTITIONER

## 2025-01-02 PROCEDURE — 36415 COLL VENOUS BLD VENIPUNCTURE: CPT

## 2025-01-02 PROCEDURE — 85027 COMPLETE CBC AUTOMATED: CPT

## 2025-01-02 PROCEDURE — 99406 BEHAV CHNG SMOKING 3-10 MIN: CPT | Performed by: NURSE PRACTITIONER

## 2025-01-02 ASSESSMENT — DUKE ACTIVITY SCORE INDEX (DASI)
CAN YOU TAKE CARE OF YOURSELF (EAT, DRESS, BATHE, OR USE TOILET): YES
CAN YOU HAVE SEXUAL RELATIONS: YES
CAN YOU DO MODERATE WORK AROUND THE HOUSE LIKE VACUUMING, SWEEPING FLOORS OR CARRYING GROCERIES: YES
CAN YOU CLIMB A FLIGHT OF STAIRS OR WALK UP A HILL: YES
CAN YOU WALK A BLOCK OR TWO ON LEVEL GROUND: YES
DASI METS SCORE: 9.9
CAN YOU DO HEAVY WORK AROUND THE HOUSE LIKE SCRUBBING FLOORS OR LIFTING AND MOVING HEAVY FURNITURE: YES
CAN YOU RUN A SHORT DISTANCE: YES
CAN YOU PARTICIPATE IN STRENOUS SPORTS LIKE SWIMMING, SINGLES TENNIS, FOOTBALL, BASKETBALL, OR SKIING: YES
TOTAL_SCORE: 58.2
CAN YOU DO LIGHT WORK AROUND THE HOUSE LIKE DUSTING OR WASHING DISHES: YES
CAN YOU PARTICIPATE IN MODERATE RECREATIONAL ACTIVITIES LIKE GOLF, BOWLING, DANCING, DOUBLES TENNIS OR THROWING A BASEBALL OR FOOTBALL: YES
CAN YOU DO YARD WORK LIKE RAKING LEAVES, WEEDING OR PUSHING A MOWER: YES
CAN YOU WALK INDOORS, SUCH AS AROUND YOUR HOUSE: YES

## 2025-01-02 ASSESSMENT — LIFESTYLE VARIABLES: SMOKING_STATUS: NONSMOKER

## 2025-01-02 ASSESSMENT — ENCOUNTER SYMPTOMS
RESPIRATORY NEGATIVE: 1
CARDIOVASCULAR NEGATIVE: 1
GASTROINTESTINAL NEGATIVE: 1
NECK NEGATIVE: 1
EYES NEGATIVE: 1
NEUROLOGICAL NEGATIVE: 1
CONSTITUTIONAL NEGATIVE: 1
MUSCULOSKELETAL NEGATIVE: 1
ENDOCRINE NEGATIVE: 1

## 2025-01-02 NOTE — CPM/PAT H&P
"CPM/PAT Evaluation       Name: Lashawn Bear (Lashawn Bear \"Leona\")  /Age: 1950/74 y.o.     Visit Type:   In-Person       Chief Complaint: perioperative evaluation , endometrial carcinoma    HPI  The patient is a 74 year old female with history of AUB s/p hysteroscopy . Pathology consistent with endometrioid carcinoma (FIGO grade 1). She currently denies fever, chills, n/v, abdominal pain, chest pain, sob or changes in bowel or bladder pattern. She presents today for perioperative evaluation in anticipation of total laparoscopic hysterectomy, bilateral salpingo-oophorectomy, sentinel lymph node dissection - Bilateral on 25 with Dr. Shelton.    Past Medical History:   Diagnosis Date    Basal cell carcinoma of skin of other part of trunk     Basal cell carcinoma of back, s/p WLE in     Cataract 2021    Dysfunctional uterine bleeding     s/p Hysteroscopy 2024    Eczema Entire life    Elevated coronary artery calcium score     2020--Elevated coronary artery calcium score of 154.35*.    History of blood transfusion     Transfused several years ago    Hyperlipidemia     Myopia, bilateral 2021    Bilateral myopia    Other conditions influencing health status 2021    Small bowel perforation, intraoperative    Personal history of other diseases of the female genital tract     History of breast disorder    Personal history of other diseases of the female genital tract 2020    History of postmenopausal bleeding    Puckering of macula, left eye 10/21/2021    Macular puckering of retina, left eye    Unspecified astigmatism, bilateral 10/21/2021    Astigmatism, bilateral    Uterine cancer (Multi)     seen by Dr. Cara Shelton 24    Vision loss     wears corrective lens       Past Surgical History:   Procedure Laterality Date    ADENOIDECTOMY  1965    APPENDECTOMY  2014    Appendectomy    BREAST CYST ASPIRATION Right 1999    1990's benign " cyst aspiration    CATARACT EXTRACTION       SECTION, LOW TRANSVERSE      C Section x2    CHOLECYSTECTOMY      COLON SURGERY  2021    COLPOSCOPY  1979    DILATION AND CURETTAGE OF UTERUS      HYSTEROSCOPY      OOPHORECTOMY  2014    Oophorectomy - Unilateral (Removal Of One Ovary)    OTHER SURGICAL HISTORY  2019    Mohs surgery    OTHER SURGICAL HISTORY  2018    Biopsy Skin    OTHER SURGICAL HISTORY  2021    Small bowel resection    TONSILLECTOMY  2014    Tonsillectomy    TUBAL LIGATION  10/01/1983    WISDOM TOOTH EXTRACTION  1970       Patient  has no history on file for sexual activity.    Family History   Problem Relation Name Age of Onset    Basal cell carcinoma Mother Alma     Other (CARDIAC DISORDER) Mother Alma     Heart attack Mother Alma     Heart disease Mother Alma     Diabetes Father Urban     Alzheimer's disease Father Urban     Breast cancer Sister Alma Morrissey 65    Seizures Sister Alma Morrissey     Other (TRAMATIC BRAIN INJURY) Sister Alma Morrissey     Cancer Sister Jyoti        No Known Allergies    Prior to Admission medications    Medication Sig Start Date End Date Taking? Authorizing Provider   aspirin 325 mg tablet Take 1 tablet (325 mg) by mouth every 4 hours if needed for mild pain (1 - 3).    Historical Provider, MD   rosuvastatin (Crestor) 10 mg tablet Take 1 tablet (10 mg) by mouth once daily. 24  Montse Lock MD        PAT ROS:   Constitutional:   neg    Neuro/Psych:   neg    Eyes:   neg     use of corrective lenses  Ears:   neg    Nose:   neg    Mouth:   neg    Throat:   neg    Neck:   neg    Cardio:   neg    Respiratory:   neg    Endocrine:   neg    GI:   neg    :   neg    Musculoskeletal:   neg    Hematologic:   neg    Skin:   rash (followed by dermatology)      Physical Exam  Vitals reviewed.   Constitutional:       Appearance: Normal appearance.   HENT:      Head: Normocephalic and atraumatic.      Nose: Nose normal.       "Mouth/Throat:      Mouth: Mucous membranes are moist.      Pharynx: Oropharynx is clear.   Eyes:      Extraocular Movements: Extraocular movements intact.      Conjunctiva/sclera: Conjunctivae normal.      Pupils: Pupils are equal, round, and reactive to light.   Cardiovascular:      Rate and Rhythm: Normal rate and regular rhythm.      Pulses: Normal pulses.      Heart sounds: Normal heart sounds.   Pulmonary:      Effort: Pulmonary effort is normal.      Breath sounds: Normal breath sounds.   Musculoskeletal:         General: Normal range of motion.      Cervical back: Normal range of motion.   Skin:     General: Skin is warm and dry.   Neurological:      General: No focal deficit present.      Mental Status: She is alert and oriented to person, place, and time.   Psychiatric:         Mood and Affect: Mood normal.         Behavior: Behavior normal.          PAT AIRWAY:   Airway:     Mallampati::  III    TM distance::  >3 FB    Neck ROM::  Full  normal        Visit Vitals  /76   Pulse 79   Temp 36.5 °C (97.7 °F) (Temporal)   Ht 1.702 m (5' 7\")   Wt 88.1 kg (194 lb 3.2 oz)   BMI 30.42 kg/m²   OB Status Postmenopausal   Smoking Status Some Days   BSA 2.04 m²       DASI Risk Score      Flowsheet Row Pre-Admission Testing from 1/2/2025 in [facility] Questionnaire Series Submission from 12/17/2024 in [facility] with [facility] Provider Nuvia   Can you take care of yourself (eat, dress, bathe, or use toilet)?  2.75 filed at 01/02/2025 0941 2.75  filed at 12/17/2024 1111   Can you walk indoors, such as around your house? 1.75 filed at 01/02/2025 0941 1.75  filed at 12/17/2024 1111   Can you walk a block or two on level ground?  2.75 filed at 01/02/2025 0941 2.75  filed at 12/17/2024 1111   Can you climb a flight of stairs or walk up a hill? 5.5 filed at 01/02/2025 0941 5.5  filed at 12/17/2024 1111   Can you run a short distance? 8 filed at 01/02/2025 0941 8  filed at 12/17/2024 1111   Can you do " light work around the house like dusting or washing dishes? 2.7 filed at 01/02/2025 0941 2.7  filed at 12/17/2024 1111   Can you do moderate work around the house like vacuuming, sweeping floors or carrying groceries? 3.5 filed at 01/02/2025 0941 3.5  filed at 12/17/2024 1111   Can you do heavy work around the house like scrubbing floors or lifting and moving heavy furniture?  8 filed at 01/02/2025 0941 8  filed at 12/17/2024 1111   Can you do yard work like raking leaves, weeding or pushing a mower? 4.5 filed at 01/02/2025 0941 4.5  filed at 12/17/2024 1111   Can you have sexual relations? 5.25 filed at 01/02/2025 0941 5.25  filed at 12/17/2024 1111   Can you participate in moderate recreational activities like golf, bowling, dancing, doubles tennis or throwing a baseball or football? 6 filed at 01/02/2025 0941 6  filed at 12/17/2024 1111   Can you participate in strenous sports like swimming, singles tennis, football, basketball, or skiing? 7.5 filed at 01/02/2025 0941 0  filed at 12/17/2024 1111   DASI SCORE 58.2 filed at 01/02/2025 0941 50.7  filed at 12/17/2024 1111   METS Score (Will be calculated only when all the questions are answered) 9.9 filed at 01/02/2025 0941 9  filed at 12/17/2024 1111          Caprini DVT Assessment      Flowsheet Row Pre-Admission Testing from 1/2/2025 in [facility]   DVT Score 10 filed at 01/02/2025 0905   Medical Factors Present cancer, chemotherapy, or previous malignancy filed at 01/02/2025 0905   Surgical Factors Major surgery planned, lasting 2-3 hours filed at 01/02/2025 0905   BMI 31-40 (Obesity) filed at 01/02/2025 0905          Modified Frailty Index      Flowsheet Row Pre-Admission Testing from 1/2/2025 in [facility]   Non-independent functional status (problems with dressing, bathing, personal grooming, or cooking) 0 filed at 01/02/2025 0905   History of diabetes mellitus  0 filed at 01/02/2025 0905   History of COPD 0 filed at  01/02/2025 0905   History of CHF No filed at 01/02/2025 0905   History of MI 0 filed at 01/02/2025 0905   History of Percutaneous Coronary Intervention, Cardiac Surgery, or Angina No filed at 01/02/2025 0905   Hypertension requiring the use of medication  0 filed at 01/02/2025 0905   Peripheral vascular disease 0 filed at 01/02/2025 0905   Impaired sensorium (cognitive impairement or loss, clouding, or delirium) 0 filed at 01/02/2025 0905   TIA or CVA withouy residual deficit 0 filed at 01/02/2025 0905   Cerebrovascular accident with deficit 0 filed at 01/02/2025 0905   Modified Frailty Index Calculator 0 filed at 01/02/2025 0905          CHADS2 Stroke Risk  Current as of 44 minutes ago        N/A 3 to 100%: High Risk   2 to < 3%: Medium Risk   0 to < 2%: Low Risk     Last Change: N/A          This score determines the patient's risk of having a stroke if the patient has atrial fibrillation.        This score is not applicable to this patient. Components are not calculated.          Revised Cardiac Risk Index      Flowsheet Row Pre-Admission Testing from 1/2/2025 in [facility]   High-Risk Surgery (Intraperitoneal, Intrathoracic,Suprainguinal vascular) 0 filed at 01/02/2025 0905   History of ischemic heart disease (History of MI, History of positive exercuse test, Current chest paint considered due to myocardial ischemia, Use of nitrate therapy, ECG with pathological Q Waves) 0 filed at 01/02/2025 0905   History of congestive heart failure (pulmonary edemia, bilateral rales or S3 gallop, Paroxysmal nocturnal dyspnea, CXR showing pulmonary vascular redistribution) 0 filed at 01/02/2025 0905   History of cerebrovascular disease (Prior TIA or stroke) 0 filed at 01/02/2025 0905   Pre-operative insulin treatment 0 filed at 01/02/2025 0905   Pre-operative creatinine>2 mg/dl 0 filed at 01/02/2025 0905   Revised Cardiac Risk Calculator 0 filed at 01/02/2025 0905          Apfel Simplified Score      Flowsheet  Row Pre-Admission Testing from 1/2/2025 in [facility]   Smoking status 1 filed at 01/02/2025 0905   History of motion sickness or PONV  0 filed at 01/02/2025 0905   Use of postoperative opioids 1 filed at 01/02/2025 0905   Gender - Female 1=Yes filed at 01/02/2025 0905   Apfel Simplified Score Calculator 3 filed at 01/02/2025 0905          Risk Analysis Index Results This Encounter    No data found in the last 10 encounters.       Stop Bang Score      Flowsheet Row Pre-Admission Testing from 1/2/2025 in [facility] Questionnaire Series Submission from 12/17/2024 in [facility] with Generic Provider Nuvia   Do you snore loudly? 0 filed at 01/02/2025 0940 0  filed at 12/17/2024 1111   Do you often feel tired or fatigued after your sleep? 0 filed at 01/02/2025 0940 0  filed at 12/17/2024 1111   Has anyone ever observed you stop breathing in your sleep? 0 filed at 01/02/2025 0940 0  filed at 12/17/2024 1111   Do you have or are you being treated for high blood pressure? 0 filed at 01/02/2025 0940 0  filed at 12/17/2024 1111   Recent BMI (Calculated) 30.4 filed at 01/02/2025 0940 31.1  filed at 12/17/2024 1111   Is BMI greater than 35 kg/m2? 0=No filed at 01/02/2025 0940 0=No  filed at 12/17/2024 1111   Age older than 50 years old? 1=Yes filed at 01/02/2025 0940 1=Yes  filed at 12/17/2024 1111   Is your neck circumference greater than 17 inches (Male) or 16 inches (Female)? 0 filed at 01/02/2025 0940 --   Gender - Male 0=No filed at 01/02/2025 0940 0=No  filed at 12/17/2024 1111   STOP-BANG Total Score 1 filed at 01/02/2025 0940 --          Prodigy: High Risk  Total Score: 12              Prodigy Age Score           ARISCAT Score for Postoperative Pulmonary Complications    No data to display       Ryan Perioperative Risk for Myocardial Infarction or Cardiac Arrest (ANSHU)    No data to display       Recent Results (from the past 4 weeks)   CBC    Collection Time: 01/02/25  9:55 AM    Result Value Ref Range    WBC 5.6 4.4 - 11.3 x10*3/uL    nRBC 0.0 0.0 - 0.0 /100 WBCs    RBC 4.58 4.00 - 5.20 x10*6/uL    Hemoglobin 14.9 12.0 - 16.0 g/dL    Hematocrit 45.3 36.0 - 46.0 %    MCV 99 80 - 100 fL    MCH 32.5 26.0 - 34.0 pg    MCHC 32.9 32.0 - 36.0 g/dL    RDW 12.8 11.5 - 14.5 %    Platelets 172 150 - 450 x10*3/uL   Basic Metabolic Panel    Collection Time: 25  9:55 AM   Result Value Ref Range    Glucose 101 (H) 74 - 99 mg/dL    Sodium 141 136 - 145 mmol/L    Potassium 4.9 3.5 - 5.3 mmol/L    Chloride 105 98 - 107 mmol/L    Bicarbonate 28 21 - 32 mmol/L    Anion Gap 13 10 - 20 mmol/L    Urea Nitrogen 12 6 - 23 mg/dL    Creatinine 0.85 0.50 - 1.05 mg/dL    eGFR 72 >60 mL/min/1.73m*2    Calcium 9.4 8.6 - 10.6 mg/dL   Type And Screen    Collection Time: 25  9:55 AM   Result Value Ref Range    ABO TYPE A     Rh TYPE NEG     ANTIBODY SCREEN NEG         Diagnostic Results    - EK24  Sinus rhythm  Normal ECG     - Echo: 21  CONCLUSIONS:   1. The left ventricular systolic function is normal with a 60-65% estimated ejection fraction.   2. RVSP within normal limits.     - CT Chest: 21  IMPRESSION:  1. Aneurysmal ascending and ectatic descending segments of the  thoracic aorta.  2. No aortic dissection or intramural hematoma. No pulmonary embolus.  3. Distended gallbladder with gallstones without gallbladder wall  thickening or surrounding fluid, equivocal for acute cholecystitis.     - CT Calcium scorin20  IMPRESSION:  1. Elevated coronary artery calcium score of 154.35*.  2. Ascending aorta is aneurysmal measuring 4.0 cm. Recommend cardiac  MRI and MR angiography for evaluation of aortic valve morphology and ascending aorta.     - US Pelvis transabdominal: 24  IMPRESSION:  Nonvisualization of the ovaries.      The endometrium measures 7 mm in thickness. There is no uterine mass.  No sign of free pelvic fluid.        Assessment and Plan:     Anesthesia  The  patient denies problems with anesthesia in the past such as PONV, prolonged sedation, awareness, dental damage, aspiration, cardiac arrest, difficult intubation, or unexpected hospital admissions.     Neurology  The patient has no neurological diagnoses or significant findings on chart review, clinical presentation, and evaluation. No grossly apparent neurological perioperative risk. The patient is at increased risk for postoperative delirium secondary to age 65 or older. The patient is at increased risk for perioperative stroke secondary to increased age, hyperlipidemia, female gender, general anesthesia, operative time >2.5 hours. Handouts for preoperative brain exercises given to patient.    HEENT/Airway  No diagnoses, significant findings on chart review, clinical presentation, or evaluation. No documented or reported history of airway difficulty.     Cardiovascular  #HLD  -prior elevated coronary artery calcium score of 154.35 in 2020, managed on rosuvastatin, instructed to continue as prescribed in the perioperative period.     Cardiology Evaluation  The patient is not followed by cardiology.    She is scheduled for non-cardiac surgery associated with elevated risk. The patient has no major cardiac contraindications to non- cardiac surgery.    METS  The patient's functional capacity capacity is greater than 4 METS.  RCRI  The patient meets 0 RCRI criteria and therefor has a 3.9% risk of major adverse cardiac complications.  ANSHU score which indicates a 1.3% risk of intraoperative or 30-day postoperative MACE (major adverse cardiac event).    Pulmonary  No significant findings on chart review or clinical presentation and evaluation. The patient is at increased risk of perioperative pulmonary complications secondary to ongoing tobacco abuse, advanced age greater than 60. Smoking cessation discussed.    STOP BANG 1, which places patient at low risk for having BIBI.  ARISCAT 43, Intermediate, 13.3% risk of  in-hospital postoperative pulmonary complications  PRODIGY 11, intermediate risk of respiratory depression episode.     Patient given PI sheet for preoperative deep breathing exercises.  Encourage  incentive spirometry in the postoperative period as deemed necessary.    Endocrine  No diagnoses or significant findings on chart review or clinical presentation and evaluation.    Gastrointestinal  No diagnoses or significant findings on chart review or clinical presentation and evaluation.    Eat 10- 0,  self-perceived oropharyngeal dysphagia scale (0-40)     Genitourinary  No diagnoses or significant findings on chart review or clinical presentation and evaluation.    Renal  No renal diagnoses or significant findings on chart review or clinical presentation and evaluation. The patient has specific risk factors associated with increased risk of perioperative renal complications related to age greater than 55. Preventative measures include preoperative hydration.    Hematology  No diagnoses or significant findings on chart review or clinical presentation and evaluation.    Caprini score 10, high risk of perioperative VTE.     Patient instructed to ambulate as soon as possible postoperatively to decrease thromboembolic risk. Initiate mechanical DVT prophylaxis as soon as possible and initiate chemical prophylaxis when deemed safe from a bleeding standpoint post surgery.     Transfusion Evaluation  A type and screen was obtained given the likelihood for perioperative transfusion of blood or blood products.    Musculoskeletal  No diagnoses or significant findings on chart review or clinical presentation and evaluation.    ID  No diagnoses or significant findings on chart review or clinical presentation and evaluation.    Other  #Skin cancer  -prior basal cell carcinoma of the scalp/neck s/p excision    -Preoperative medication instructions were provided and reviewed with the patient.  Any additional testing or evaluation  was explained to the patient.  NPO Instructions were discussed, and the patient's questions were answered prior to conclusion of this encounter. Patient verbalized understanding of preoperative instructions. After Visit Summary given.

## 2025-01-02 NOTE — PREPROCEDURE INSTRUCTIONS
Fasting Guidelines    NPO Instructions:    Do not eat any food after midnight the night before your surgery/procedure.  You may have up to 13.5 ounces of clear liquids until TWO hours before your instructed arrival time to the hospital. This includes water, black tea/coffee, (no milk or cream), apple juice, and/or electrolyte drinks (Gatorade).  You may chew gum up to TWO hours before your surgery/procedure.    Additional Instructions:    Avoid herbal supplements, multivitamins and NSAIDS (non-steroidal anti-inflammatory drugs) such as Advil, Aleve, Ibuprofen, Naproxen, Excedrin, Meloxicam or Celebrex for at least 7 days prior to surgery. May take Tylenol as needed.    Avoid tobacco and alcohol products for 24 hours prior to surgery.    CONTACT SURGEON'S OFFICE IF YOU DEVELOP:  * Fever = 100.4 F   * New respiratory symptoms (e.g. cough, shortness of breath, respiratory distress, sore throat)  * Recent loss of taste or smell  *Flu like symptoms such as headache, fatigue or gastrointestinal symptoms  * You develop any open sores, shingles, burning or painful urination   AND/OR:  * You no longer wish to have the surgery.  * Any other personal circumstances change that may lead to the need to cancel or defer this surgery.  *You were admitted to any hospital within one week of your planned procedure.    Seven/Six Days before Surgery:  Review your medication instructions, stop indicated medications    Day of Surgery:  Review your medication instructions, take indicated medications  Wear comfortable loose fitting clothing  Do not use moisturizers, creams, lotions or perfume  All jewelry and valuables should be left at home    Keeley Monterroso [facility]  [facility]  [phone]  [phone]  Email-[email or web address]      Preoperative Brain Exercises    What are brain exercises?  A brain exercise is any activity that engages your thinking (cognitive) skills.    What types of  activities are considered brain exercises?  Jigsaw puzzles, crossword puzzles, word jumble, memory games, word search, and many more.  Many can be found free online or on your phone via a mobile gabbie.    Why should I do brain exercises before my surgery?  More recent research has shown brain exercise before surgery can lower the risk of postoperative delirium (confusion) which can be especially important for older adults.  Patients who did brain exercises for 5 to 10 hours the days before surgery, cut their risk of postoperative delirium in half up to 1 week after surgery.         [facility]    Preoperative Deep Breathing Exercises    Why it is important to do deep breathing exercises before my surgery?  Deep breathing exercises strengthen your breathing muscles.  This helps you to recover after your surgery and decreases the chance of breathing complications.      How are the deep breathing exercises done?  Sit straight with your back supported.  Breathe in deeply and slowly through your nose. Your lower rib cage should expand and your abdomen may move forward.  Hold that breath for 3 to 5 seconds.  Breathe out through pursed lips, slowly and completely.  Rest and repeat 10 times every hour while awake.  Rest longer if you become dizzy or lightheaded.         Patient and Family Education             Ways You Can Help Prevent Blood Clots             This handout explains some simple things you can do to help prevent blood clots.      Blood clots are blockages that can form in the body's veins. When a blood clot forms in your deep veins, it may be called a deep vein thrombosis, or DVT for short. Blood clots can happen in any part of the body where blood flows, but they are most common in the arms and legs. If a piece of a blood clot breaks free and travels to the lungs, it is called a pulmonary embolus (PE). A PE can be a very serious problem.         Being in the hospital or having surgery  can raise your chances of getting a blood clot because you may not be well enough to move around as much as you normally do.         Ways you can help prevent blood clots in the hospital         Wearing SCDs. SCDs stands for Sequential Compression Devices.   SCDs are special sleeves that wrap around your legs  They attach to a pump that fills them with air to gently squeeze your legs every few minutes.   This helps return the blood in your legs to your heart.   SCDs should only be taken off when walking or bathing.   SCDs may not be comfortable, but they can help save your life.               Wearing compression stockings - if your doctor orders them. These special snug fitting stockings gently squeeze your legs to help blood flow.       Walking. Walking helps move the blood in your legs.   If your doctor says it is ok, try walking the halls at least   5 times a day. Ask us to help you get up, so you don't fall.      Taking any blood thinning medicines your doctor orders.        Page 1 of 2     [facility]; 3/23   Ways you can help prevent blood clots at home       Wearing compression stockings - if your doctor orders them. ? Walking - to help move the blood in your legs.       Taking any blood thinning medicines your doctor orders.      Signs of a blood clot or PE      Tell your doctor or nurse know right away if you have of the problems listed below.    If you are at home, seek medical care right away. Call 911 for chest pain or problems breathing.               Signs of a blood clot (DVT) - such as pain,  swelling, redness or warmth in your arm or leg      Signs of a pulmonary embolism (PE) - such as chest     pain or feeling short of breath

## 2025-01-06 NOTE — HOSPITAL COURSE
[ ] consent - needs  [ x] team  [ x] orders    75 yo with endometrioid carcinoma of endometrium, here for TLH, BSO, SLND.    EMB 11/8/24 atypical hyperplasia bordering on G1 endometrioid carcinoma     Lab Results   Component Value Date    WBC 5.6 01/02/2025    HGB 14.9 01/02/2025    HCT 45.3 01/02/2025    MCV 99 01/02/2025     01/02/2025     Lab Results   Component Value Date    GLUCOSE 101 (H) 01/02/2025    CALCIUM 9.4 01/02/2025     01/02/2025    K 4.9 01/02/2025    CO2 28 01/02/2025     01/02/2025    BUN 12 01/02/2025    CREATININE 0.85 01/02/2025     TVUS 9/2024:  Retroverted uterus 10.6 x 3.6 x 4 cm. Endometrial thickness 7mm. Ovaries not visualized    Past Medical History:   Diagnosis Date    Basal cell carcinoma of skin of other part of trunk     Basal cell carcinoma of back, s/p WLE in 2012/2013    Cataract 05/19/2021    Dysfunctional uterine bleeding     s/p Hysteroscopy Nov 2024    Eczema Entire life    Elevated coronary artery calcium score     9/2020--Elevated coronary artery calcium score of 154.35*.    History of blood transfusion     Transfused several years ago    Hyperlipidemia     Myopia, bilateral 08/04/2021    Bilateral myopia    Other conditions influencing health status 07/07/2021    Small bowel perforation, intraoperative    Personal history of other diseases of the female genital tract     History of breast disorder    Personal history of other diseases of the female genital tract 02/18/2020    History of postmenopausal bleeding    Puckering of macula, left eye 10/21/2021    Macular puckering of retina, left eye    Unspecified astigmatism, bilateral 10/21/2021    Astigmatism, bilateral    Uterine cancer (Multi)     seen by Dr. Cara Shelton 12/16/24    Vision loss     wears corrective lens     Past Surgical History:   Procedure Laterality Date    ADENOIDECTOMY  04/01/1965    APPENDECTOMY  02/28/2014    Appendectomy    BREAST CYST ASPIRATION Right 01/01/1999    1990's benign  cyst aspiration    CATARACT EXTRACTION       SECTION, LOW TRANSVERSE      C Section x2    CHOLECYSTECTOMY      COLON SURGERY  2021    COLPOSCOPY  1979    DILATION AND CURETTAGE OF UTERUS      HYSTEROSCOPY      OOPHORECTOMY  2014    Oophorectomy - Unilateral (Removal Of One Ovary)    OTHER SURGICAL HISTORY  2019    Mohs surgery    OTHER SURGICAL HISTORY  2018    Biopsy Skin    OTHER SURGICAL HISTORY  2021    Small bowel resection    TONSILLECTOMY  2014    Tonsillectomy    TUBAL LIGATION  10/01/1983    WISDOM TOOTH EXTRACTION  1970     OBGHx:  C section x2  Menopause at age 43    Family History   Problem Relation Name Age of Onset    Basal cell carcinoma Mother Alma     Other (CARDIAC DISORDER) Mother Alma     Heart attack Mother Alma     Heart disease Mother Alma     Diabetes Father Urban     Alzheimer's disease Father Urban     Breast cancer Sister Alma Morrissey 65    Seizures Sister Alma Morrissey     Other (TRAMATIC BRAIN INJURY) Sister Alma Morrissey     Cancer Sister Jyoti      Social History     Tobacco Use    Smoking status: Some Days     Types: Cigarettes    Smokeless tobacco: Never   Vaping Use    Vaping status: Never Used   Substance Use Topics    Alcohol use: Yes     Alcohol/week: 7.0 standard drinks of alcohol     Types: 7 Glasses of wine per week     Comment: one serving daily    Drug use: Never     Meds: ASA, Crestor    No Known Allergies    Objective   There were no vitals filed for this visit.  General: Alert and oriented, in NAD  Neuro: Awake, alert, conversational  CV: Regular rate  Respiratory: Even and unlabored on RA  Extremities: Full ROM  Psych: appropriate mood and affect

## 2025-01-08 ENCOUNTER — ANESTHESIA EVENT (OUTPATIENT)
Dept: OPERATING ROOM | Facility: HOSPITAL | Age: 75
End: 2025-01-08
Payer: MEDICARE

## 2025-01-09 ENCOUNTER — HOSPITAL ENCOUNTER (OUTPATIENT)
Facility: HOSPITAL | Age: 75
Setting detail: OUTPATIENT SURGERY
Discharge: HOME | End: 2025-01-09
Attending: OBSTETRICS & GYNECOLOGY | Admitting: OBSTETRICS & GYNECOLOGY
Payer: MEDICARE

## 2025-01-09 ENCOUNTER — ANESTHESIA (OUTPATIENT)
Dept: OPERATING ROOM | Facility: HOSPITAL | Age: 75
End: 2025-01-09
Payer: MEDICARE

## 2025-01-09 VITALS
OXYGEN SATURATION: 93 % | HEIGHT: 67 IN | WEIGHT: 194 LBS | DIASTOLIC BLOOD PRESSURE: 66 MMHG | HEART RATE: 71 BPM | RESPIRATION RATE: 17 BRPM | BODY MASS INDEX: 30.45 KG/M2 | SYSTOLIC BLOOD PRESSURE: 106 MMHG | TEMPERATURE: 98.2 F

## 2025-01-09 DIAGNOSIS — Z98.890 POSTOPERATIVE STATE: Primary | ICD-10-CM

## 2025-01-09 DIAGNOSIS — C54.1 ENDOMETRIOID CARCINOMA OF ENDOMETRIUM (MULTI): ICD-10-CM

## 2025-01-09 PROCEDURE — 7100000002 HC RECOVERY ROOM TIME - EACH INCREMENTAL 1 MINUTE: Performed by: OBSTETRICS & GYNECOLOGY

## 2025-01-09 PROCEDURE — P9045 ALBUMIN (HUMAN), 5%, 250 ML: HCPCS | Mod: JZ,JG,TB

## 2025-01-09 PROCEDURE — 2500000004 HC RX 250 GENERAL PHARMACY W/ HCPCS (ALT 636 FOR OP/ED)

## 2025-01-09 PROCEDURE — 88341 IMHCHEM/IMCYTCHM EA ADD ANTB: CPT | Performed by: STUDENT IN AN ORGANIZED HEALTH CARE EDUCATION/TRAINING PROGRAM

## 2025-01-09 PROCEDURE — 99231 SBSQ HOSP IP/OBS SF/LOW 25: CPT

## 2025-01-09 PROCEDURE — 3600000009 HC OR TIME - EACH INCREMENTAL 1 MINUTE - PROCEDURE LEVEL FOUR: Performed by: OBSTETRICS & GYNECOLOGY

## 2025-01-09 PROCEDURE — 88341 IMHCHEM/IMCYTCHM EA ADD ANTB: CPT | Mod: TC,SUR,WESLAB | Performed by: OBSTETRICS & GYNECOLOGY

## 2025-01-09 PROCEDURE — 2500000005 HC RX 250 GENERAL PHARMACY W/O HCPCS

## 2025-01-09 PROCEDURE — 3700000001 HC GENERAL ANESTHESIA TIME - INITIAL BASE CHARGE: Performed by: OBSTETRICS & GYNECOLOGY

## 2025-01-09 PROCEDURE — 96372 THER/PROPH/DIAG INJ SC/IM: CPT

## 2025-01-09 PROCEDURE — 7100000001 HC RECOVERY ROOM TIME - INITIAL BASE CHARGE: Performed by: OBSTETRICS & GYNECOLOGY

## 2025-01-09 PROCEDURE — 3700000002 HC GENERAL ANESTHESIA TIME - EACH INCREMENTAL 1 MINUTE: Performed by: OBSTETRICS & GYNECOLOGY

## 2025-01-09 PROCEDURE — 2500000001 HC RX 250 WO HCPCS SELF ADMINISTERED DRUGS (ALT 637 FOR MEDICARE OP)

## 2025-01-09 PROCEDURE — 2500000005 HC RX 250 GENERAL PHARMACY W/O HCPCS: Performed by: OBSTETRICS & GYNECOLOGY

## 2025-01-09 PROCEDURE — 88309 TISSUE EXAM BY PATHOLOGIST: CPT | Performed by: STUDENT IN AN ORGANIZED HEALTH CARE EDUCATION/TRAINING PROGRAM

## 2025-01-09 PROCEDURE — 2720000007 HC OR 272 NO HCPCS: Performed by: OBSTETRICS & GYNECOLOGY

## 2025-01-09 PROCEDURE — 3600000004 HC OR TIME - INITIAL BASE CHARGE - PROCEDURE LEVEL FOUR: Performed by: OBSTETRICS & GYNECOLOGY

## 2025-01-09 PROCEDURE — 58571 TLH W/T/O 250 G OR LESS: CPT | Performed by: OBSTETRICS & GYNECOLOGY

## 2025-01-09 PROCEDURE — 7100000009 HC PHASE TWO TIME - INITIAL BASE CHARGE: Performed by: OBSTETRICS & GYNECOLOGY

## 2025-01-09 PROCEDURE — 88342 IMHCHEM/IMCYTCHM 1ST ANTB: CPT | Performed by: STUDENT IN AN ORGANIZED HEALTH CARE EDUCATION/TRAINING PROGRAM

## 2025-01-09 PROCEDURE — A58571 PR LAPAROSCOPY W TOT HYSTERECTUTERUS <=250 GRAM  W TUBE/OVARY: Performed by: STUDENT IN AN ORGANIZED HEALTH CARE EDUCATION/TRAINING PROGRAM

## 2025-01-09 PROCEDURE — 99100 ANES PT EXTEME AGE<1 YR&>70: CPT | Performed by: STUDENT IN AN ORGANIZED HEALTH CARE EDUCATION/TRAINING PROGRAM

## 2025-01-09 PROCEDURE — 2500000001 HC RX 250 WO HCPCS SELF ADMINISTERED DRUGS (ALT 637 FOR MEDICARE OP): Performed by: STUDENT IN AN ORGANIZED HEALTH CARE EDUCATION/TRAINING PROGRAM

## 2025-01-09 PROCEDURE — 7100000010 HC PHASE TWO TIME - EACH INCREMENTAL 1 MINUTE: Performed by: OBSTETRICS & GYNECOLOGY

## 2025-01-09 PROCEDURE — 2500000004 HC RX 250 GENERAL PHARMACY W/ HCPCS (ALT 636 FOR OP/ED): Performed by: STUDENT IN AN ORGANIZED HEALTH CARE EDUCATION/TRAINING PROGRAM

## 2025-01-09 RX ORDER — MIDAZOLAM HYDROCHLORIDE 1 MG/ML
INJECTION INTRAMUSCULAR; INTRAVENOUS AS NEEDED
Status: DISCONTINUED | OUTPATIENT
Start: 2025-01-09 | End: 2025-01-09

## 2025-01-09 RX ORDER — GABAPENTIN 300 MG/1
300 CAPSULE ORAL ONCE
Status: COMPLETED | OUTPATIENT
Start: 2025-01-09 | End: 2025-01-09

## 2025-01-09 RX ORDER — ONDANSETRON 4 MG/1
4 TABLET, FILM COATED ORAL EVERY 8 HOURS PRN
Qty: 20 TABLET | Refills: 0 | Status: SHIPPED | OUTPATIENT
Start: 2025-01-09

## 2025-01-09 RX ORDER — OXYCODONE HYDROCHLORIDE 5 MG/1
10 TABLET ORAL EVERY 4 HOURS PRN
Status: DISCONTINUED | OUTPATIENT
Start: 2025-01-09 | End: 2025-01-09 | Stop reason: HOSPADM

## 2025-01-09 RX ORDER — CEFAZOLIN 1 G/1
INJECTION, POWDER, FOR SOLUTION INTRAVENOUS AS NEEDED
Status: DISCONTINUED | OUTPATIENT
Start: 2025-01-09 | End: 2025-01-09

## 2025-01-09 RX ORDER — TRAMADOL HYDROCHLORIDE 50 MG/1
50 TABLET ORAL EVERY 8 HOURS PRN
Qty: 10 TABLET | Refills: 0 | Status: SHIPPED | OUTPATIENT
Start: 2025-01-09

## 2025-01-09 RX ORDER — WATER 1 ML/ML
IRRIGANT IRRIGATION AS NEEDED
Status: DISCONTINUED | OUTPATIENT
Start: 2025-01-09 | End: 2025-01-09 | Stop reason: HOSPADM

## 2025-01-09 RX ORDER — ALBUMIN HUMAN 50 G/1000ML
SOLUTION INTRAVENOUS AS NEEDED
Status: DISCONTINUED | OUTPATIENT
Start: 2025-01-09 | End: 2025-01-09

## 2025-01-09 RX ORDER — HEPARIN SODIUM 5000 [USP'U]/ML
5000 INJECTION, SOLUTION INTRAVENOUS; SUBCUTANEOUS ONCE
Status: COMPLETED | OUTPATIENT
Start: 2025-01-09 | End: 2025-01-09

## 2025-01-09 RX ORDER — DOCUSATE SODIUM 100 MG/1
100 CAPSULE, LIQUID FILLED ORAL 2 TIMES DAILY
Qty: 60 CAPSULE | Refills: 1 | Status: SHIPPED | OUTPATIENT
Start: 2025-01-09

## 2025-01-09 RX ORDER — HYDROMORPHONE HYDROCHLORIDE 0.2 MG/ML
0.2 INJECTION INTRAMUSCULAR; INTRAVENOUS; SUBCUTANEOUS EVERY 5 MIN PRN
Status: DISCONTINUED | OUTPATIENT
Start: 2025-01-09 | End: 2025-01-09 | Stop reason: HOSPADM

## 2025-01-09 RX ORDER — ONDANSETRON HYDROCHLORIDE 2 MG/ML
4 INJECTION, SOLUTION INTRAVENOUS ONCE AS NEEDED
Status: DISCONTINUED | OUTPATIENT
Start: 2025-01-09 | End: 2025-01-09 | Stop reason: HOSPADM

## 2025-01-09 RX ORDER — IBUPROFEN 600 MG/1
600 TABLET ORAL EVERY 6 HOURS PRN
Qty: 60 TABLET | Refills: 1 | Status: SHIPPED | OUTPATIENT
Start: 2025-01-09

## 2025-01-09 RX ORDER — HYDROMORPHONE HYDROCHLORIDE 1 MG/ML
INJECTION, SOLUTION INTRAMUSCULAR; INTRAVENOUS; SUBCUTANEOUS AS NEEDED
Status: DISCONTINUED | OUTPATIENT
Start: 2025-01-09 | End: 2025-01-09

## 2025-01-09 RX ORDER — ONDANSETRON HYDROCHLORIDE 2 MG/ML
INJECTION, SOLUTION INTRAVENOUS AS NEEDED
Status: DISCONTINUED | OUTPATIENT
Start: 2025-01-09 | End: 2025-01-09

## 2025-01-09 RX ORDER — ROCURONIUM BROMIDE 10 MG/ML
INJECTION, SOLUTION INTRAVENOUS AS NEEDED
Status: DISCONTINUED | OUTPATIENT
Start: 2025-01-09 | End: 2025-01-09

## 2025-01-09 RX ORDER — SODIUM CHLORIDE, SODIUM LACTATE, POTASSIUM CHLORIDE, CALCIUM CHLORIDE 600; 310; 30; 20 MG/100ML; MG/100ML; MG/100ML; MG/100ML
INJECTION, SOLUTION INTRAVENOUS CONTINUOUS PRN
Status: DISCONTINUED | OUTPATIENT
Start: 2025-01-09 | End: 2025-01-09

## 2025-01-09 RX ORDER — PROPOFOL 10 MG/ML
INJECTION, EMULSION INTRAVENOUS AS NEEDED
Status: DISCONTINUED | OUTPATIENT
Start: 2025-01-09 | End: 2025-01-09

## 2025-01-09 RX ORDER — ACETAMINOPHEN 500 MG
1000 TABLET ORAL EVERY 6 HOURS PRN
Qty: 30 TABLET | Refills: 0 | Status: SHIPPED | OUTPATIENT
Start: 2025-01-09

## 2025-01-09 RX ORDER — FENTANYL CITRATE 50 UG/ML
INJECTION, SOLUTION INTRAMUSCULAR; INTRAVENOUS AS NEEDED
Status: DISCONTINUED | OUTPATIENT
Start: 2025-01-09 | End: 2025-01-09

## 2025-01-09 RX ORDER — ALBUTEROL SULFATE 0.83 MG/ML
2.5 SOLUTION RESPIRATORY (INHALATION) ONCE AS NEEDED
Status: DISCONTINUED | OUTPATIENT
Start: 2025-01-09 | End: 2025-01-09 | Stop reason: HOSPADM

## 2025-01-09 RX ORDER — SODIUM CHLORIDE 0.9 G/100ML
IRRIGANT IRRIGATION AS NEEDED
Status: DISCONTINUED | OUTPATIENT
Start: 2025-01-09 | End: 2025-01-09 | Stop reason: HOSPADM

## 2025-01-09 RX ORDER — LIDOCAINE HCL/PF 100 MG/5ML
SYRINGE (ML) INTRAVENOUS AS NEEDED
Status: DISCONTINUED | OUTPATIENT
Start: 2025-01-09 | End: 2025-01-09

## 2025-01-09 RX ORDER — CELECOXIB 200 MG/1
400 CAPSULE ORAL ONCE
Status: COMPLETED | OUTPATIENT
Start: 2025-01-09 | End: 2025-01-09

## 2025-01-09 RX ORDER — DROPERIDOL 2.5 MG/ML
0.62 INJECTION, SOLUTION INTRAMUSCULAR; INTRAVENOUS ONCE AS NEEDED
Status: DISCONTINUED | OUTPATIENT
Start: 2025-01-09 | End: 2025-01-09 | Stop reason: HOSPADM

## 2025-01-09 RX ORDER — ONDANSETRON 4 MG/1
4 TABLET, FILM COATED ORAL ONCE
Status: COMPLETED | OUTPATIENT
Start: 2025-01-09 | End: 2025-01-09

## 2025-01-09 RX ORDER — ACETAMINOPHEN 325 MG/1
975 TABLET ORAL ONCE
Status: COMPLETED | OUTPATIENT
Start: 2025-01-09 | End: 2025-01-09

## 2025-01-09 RX ORDER — OXYCODONE HYDROCHLORIDE 5 MG/1
5 TABLET ORAL EVERY 4 HOURS PRN
Status: DISCONTINUED | OUTPATIENT
Start: 2025-01-09 | End: 2025-01-09 | Stop reason: HOSPADM

## 2025-01-09 RX ORDER — ACETAMINOPHEN 325 MG/1
650 TABLET ORAL EVERY 4 HOURS PRN
Status: DISCONTINUED | OUTPATIENT
Start: 2025-01-09 | End: 2025-01-09 | Stop reason: HOSPADM

## 2025-01-09 RX ADMIN — FENTANYL CITRATE 50 MCG: 50 INJECTION, SOLUTION INTRAMUSCULAR; INTRAVENOUS at 11:19

## 2025-01-09 RX ADMIN — ONDANSETRON HYDROCHLORIDE 4 MG: 4 TABLET, FILM COATED ORAL at 16:30

## 2025-01-09 RX ADMIN — CEFAZOLIN 2 G: 1 INJECTION, POWDER, FOR SOLUTION INTRAMUSCULAR; INTRAVENOUS at 11:25

## 2025-01-09 RX ADMIN — HYDROMORPHONE HYDROCHLORIDE 0.2 MG: 0.2 INJECTION, SOLUTION INTRAMUSCULAR; INTRAVENOUS; SUBCUTANEOUS at 14:39

## 2025-01-09 RX ADMIN — ACETAMINOPHEN 975 MG: 325 TABLET ORAL at 09:43

## 2025-01-09 RX ADMIN — MIDAZOLAM HYDROCHLORIDE 2 MG: 1 INJECTION, SOLUTION INTRAMUSCULAR; INTRAVENOUS at 11:01

## 2025-01-09 RX ADMIN — GABAPENTIN 300 MG: 300 CAPSULE ORAL at 09:43

## 2025-01-09 RX ADMIN — ALBUMIN HUMAN 250 ML: 0.05 INJECTION, SOLUTION INTRAVENOUS at 11:44

## 2025-01-09 RX ADMIN — ROCURONIUM BROMIDE 10 MG: 10 INJECTION INTRAVENOUS at 13:11

## 2025-01-09 RX ADMIN — ROCURONIUM BROMIDE 70 MG: 10 INJECTION INTRAVENOUS at 11:19

## 2025-01-09 RX ADMIN — SODIUM CHLORIDE, POTASSIUM CHLORIDE, SODIUM LACTATE AND CALCIUM CHLORIDE: 600; 310; 30; 20 INJECTION, SOLUTION INTRAVENOUS at 10:50

## 2025-01-09 RX ADMIN — SUGAMMADEX 200 MG: 100 INJECTION, SOLUTION INTRAVENOUS at 14:08

## 2025-01-09 RX ADMIN — ROCURONIUM BROMIDE 20 MG: 10 INJECTION INTRAVENOUS at 12:16

## 2025-01-09 RX ADMIN — HEPARIN SODIUM 5000 UNITS: 5000 INJECTION, SOLUTION INTRAVENOUS; SUBCUTANEOUS at 11:00

## 2025-01-09 RX ADMIN — DEXAMETHASONE SODIUM PHOSPHATE 8 MG: 4 INJECTION INTRA-ARTICULAR; INTRALESIONAL; INTRAMUSCULAR; INTRAVENOUS; SOFT TISSUE at 11:22

## 2025-01-09 RX ADMIN — HYDROMORPHONE HYDROCHLORIDE 0.5 MG: 1 INJECTION, SOLUTION INTRAMUSCULAR; INTRAVENOUS; SUBCUTANEOUS at 14:22

## 2025-01-09 RX ADMIN — PROPOFOL 140 MG: 10 INJECTION, EMULSION INTRAVENOUS at 11:19

## 2025-01-09 RX ADMIN — HYDROMORPHONE HYDROCHLORIDE 1 MG: 1 INJECTION, SOLUTION INTRAMUSCULAR; INTRAVENOUS; SUBCUTANEOUS at 11:51

## 2025-01-09 RX ADMIN — CELECOXIB 400 MG: 200 CAPSULE ORAL at 09:42

## 2025-01-09 RX ADMIN — OXYCODONE 5 MG: 5 TABLET ORAL at 15:02

## 2025-01-09 RX ADMIN — ONDANSETRON 4 MG: 2 INJECTION INTRAMUSCULAR; INTRAVENOUS at 14:08

## 2025-01-09 RX ADMIN — LIDOCAINE HYDROCHLORIDE 100 MG: 20 INJECTION INTRAVENOUS at 11:19

## 2025-01-09 ASSESSMENT — PAIN SCALES - GENERAL
PAINLEVEL_OUTOF10: 4
PAINLEVEL_OUTOF10: 3
PAINLEVEL_OUTOF10: 0 - NO PAIN
PAINLEVEL_OUTOF10: 3
PAINLEVEL_OUTOF10: 0 - NO PAIN
PAINLEVEL_OUTOF10: 4
PAINLEVEL_OUTOF10: 6
PAINLEVEL_OUTOF10: 6
PAINLEVEL_OUTOF10: 3
PAINLEVEL_OUTOF10: 4
PAINLEVEL_OUTOF10: 0 - NO PAIN
PAINLEVEL_OUTOF10: 7
PAIN_LEVEL: 0
PAINLEVEL_OUTOF10: 3

## 2025-01-09 ASSESSMENT — COLUMBIA-SUICIDE SEVERITY RATING SCALE - C-SSRS
1. IN THE PAST MONTH, HAVE YOU WISHED YOU WERE DEAD OR WISHED YOU COULD GO TO SLEEP AND NOT WAKE UP?: NO
6. HAVE YOU EVER DONE ANYTHING, STARTED TO DO ANYTHING, OR PREPARED TO DO ANYTHING TO END YOUR LIFE?: NO
2. HAVE YOU ACTUALLY HAD ANY THOUGHTS OF KILLING YOURSELF?: NO

## 2025-01-09 ASSESSMENT — PAIN - FUNCTIONAL ASSESSMENT: PAIN_FUNCTIONAL_ASSESSMENT: 0-10

## 2025-01-09 NOTE — OP NOTE
"total laparoscopic hysterectomy, right salpingo-oophorectomy, cystoscopy Operative Note     Date: 2025  OR Location: [facility] OR    Name: Lashawn Bear \"Leona\", : 1950, Age: 74 y.o., MRN: [id number], Sex: female    Diagnosis  Pre-op Diagnosis      * Endometrioid carcinoma of endometrium (Multi) [C54.1] Post-op Diagnosis     * Endometrioid carcinoma of endometrium (Multi) [C54.1]     Procedures  total laparoscopic hysterectomy, right salpingo-oophorectomy, cystoscopy  20649 - DC LAPS TOTAL HYSTERECT 250 GM/< W/RMVL TUBE/OVARY      Surgeons      * Cara Shelton - Primary    Resident/Fellow/Other Assistant:  Surgeons and Role:     * Emma Pierson MD - Resident - Assisting     * Reyna Harding MD - Resident - Assisting     * Mitzi Pandey MD - Fellow    Staff:   Circulator: Morenita  Scrub Person: Lucero  Relief Scrub: Aliya Gutierrez Circulator: Tatiana  Circulator: Carmela    Anesthesia Staff: Anesthesiologist: Hugo Davis DO  Anesthesia Resident: Kalani Kilgore MD  Frontline Breaker: PATRICE Muhammad-SHARI    Procedure Summary  Anesthesia: Anesthesia type not filed in the log.  ASA: III  Estimated Blood Loss: 75mL  Intra-op Medications:   Administrations occurring from 1050 to 1450 on 25:   Medication Name Total Dose   sodium chloride 0.9 % irrigation solution 1,000 mL   surgical lubricant gel 1 Application   sterile water irrigation solution 800 mL   albumin human bottle 5% 250 mL   ceFAZolin (Ancef) vial 1 g 2 g   dexAMETHasone (Decadron) 4 mg/mL 8 mg   fentaNYL (Sublimaze) injection 50 mcg/mL 50 mcg   HYDROmorphone (Dilaudid) injection 1 mg/mL 1 mg   LR infusion Cannot be calculated   lidocaine (cardiac) injection 2% prefilled syringe 100 mg   midazolam PF (Versed) injection 1 mg/mL 2 mg   propofol (Diprivan) injection 10 mg/mL 140 mg   rocuronium (ZeMuron) 50 mg/5 mL injection 100 mg   heparin (porcine) injection 5,000 Units 5,000 Units              Anesthesia Record    " "           Intraprocedure I/O Totals       None           Specimen:   ID Type Source Tests Collected by Time   1 : UTERUS,CERVIX, RIGHT FALLOPIAN TUBE AND OVARY Tissue UTERUS, CERVIX, FALLOPIAN TUBE AND OVARY RIGHT SURGICAL PATHOLOGY EXAM Cara Shelton MD 1/9/2025 1320                 Drains and/or Catheters:   [REMOVED] Urethral Catheter Non-latex 16 Fr. (Removed)             Findings: Normal external female genitalia and vagina. Adhesions of the omentum and small bowel to the anterior abdominal wall at prior incision site. Dense adhesions of the anterior aspect of the uterus and the bladder serosa to the anterior abdominal wall. Dense adhesions of the uterine adnexa to the pelvic sidewalls. No SLNDx performed due to difficulty with visualization of key structures. Cystoscopy without evidence of injury to the bladder and brisk bilateral ureteral efflux noted.     Indications: Lashawn Bear \"Leona\" is an 74 y.o. female who is having surgery for Endometrioid carcinoma of endometrium (Multi) [C54.1].     The patient was seen in the preoperative area. The risks, benefits, complications, treatment options, non-operative alternatives, expected recovery and outcomes were discussed with the patient. The possibilities of reaction to medication, pulmonary aspiration, injury to surrounding structures, bleeding, recurrent infection, the need for additional procedures, failure to diagnose a condition, and creating a complication requiring transfusion or operation were discussed with the patient. The patient concurred with the proposed plan, giving informed consent.  The site of surgery was properly noted/marked if necessary per policy. The patient has been actively warmed in preoperative area. Preoperative antibiotics have been ordered and given within 1 hours of incision. Venous thrombosis prophylaxis have been ordered including bilateral sequential compression devices and chemical prophylaxis    Procedure Details: "   After informed consent was confirmed, the patient was taken to the operating room with an IV in place. A preoperative huddle and timeout were performed, with all OR personnel confirming correct patient and procedure. The patient was moved to the operating room table and SCDs were placed.  Heparin was administered preoperatively. General anesthesia was induced. She was then placed in the dorsal lithotomy position on the operating room table using Raphael stirrups. Preoperative antibiotics were administered. She was prepped and draped in a normal sterile fashion for a laparoscopic hysterectomy. A surgical pause was performed. The patient's identity and surgical procedure were again confirmed by all the surgical personnel.    A monae catheter was placed. We then started with the vaginal portion of the operation. A bivalve speculum was placed in the vagina, and the cervix was visualized. The cervix was flush with the vagina, making injection and placement of a manipulator challenging. We decided to look abdominally prior to injecting.      We then turned our attention to the laparoscopic portion of the operation. A 12mm supraumbilical vertical incision was made through the skin. This was carried down to the level of the fascia with two S retractors. The fascia was elevated with 2 kocher clamps and incised with a scalpel.  Both sides of the fascia were then tagged with 0-vicryl suture. The peritoneum was then elevated with 2 hemostats and was entered sharply with metzenbaum scissors. The josé miguel port was then placed, and entry into the peritoneal cavity was confirmed with a 10mm scope. CO2 was then insufflated into the abdomen.     Once this was accomplished, we then carefully inspected the abdomen, and there was no evidence of injury. We noted the adhesions of the omentum and small bowel to the anterior abdominal wall. The patient was placed in deep Trendelenburg. Three 5-mm accessory ports were placed under direct  visualization after infiltration with Marcaine. The uterus was noted to be densely adherent to the anterior abdominal wall and the adnexa were adherent to the pelvic sidewalls. As such, we made the decision to forgo ICG injection and placement of a manipulator.      An incision along the right pelvic sidewall lateral to the IP ligament was made to enter the retroperitoneal space, and blunt dissection was performed to visualize the right ureter. The right ureter was identified along its length. The parametria appeared free of disease bilaterally. This process was similarly repeated on the left side. Hemostasis was secure.    We then proceeded with the hysterectomy. The right round ligament was coagulated and incised. The IP ligament was skeletonized, coagulated, and ligated. The posterior peritoneum was then incised taking care to note the location of the ureter. We then repeated the same process on the left side (patient already had oophorectomy on the left side). Anteriorly, we used both blunt dissection an cautery to take down the dense adhesions of the uterus to the abdominal wall. We placed a EA sizer in the vagina to assist with visualization. We dissected along the prior adhesions until we were able to find a plane to dissect off the uterine vessels and the bladder.  The uterine vessels were then skeletonized bilaterally. They were then coagulated and ligated utilizing the LigaSure device. The LigaSure device was then used to take straight bites down the length of the cervix until we were at the level of the internal os. We made the colpotomy starting posteriorly along with EA sizer, following anteriorly. We then backfilled the bladder with sterile water to visualize the bladder wall, Futcher dissecting off the adhesions. We then completed the colpotomy.  The uterus, cervix, right tube and ovary were delivered through the patient's vagina and sent for permanent fixation. At this point, copious irrigation of  the abdomen was performed.  A survey of the abdomen was additionally performed to confirm hemostasis.     The colpotomy was closed from above using a running suture of #0 V-lock suture. Hemostasis was achieved. The abdomen was again copiously irrigated. Hemostasis was again noted.     Attention was then turned to the vulva. A 30° cystoscope was inserted per urethral meatus and advanced, under direct vision, into the urinary bladder. The bladder was distended with isotonic saline. The bladder capacity was normal, and the bladder wall was noted to expand symmetrically in all dimensions with normal appearing mucosa. Both ureteral orifices were in the normal anatomic position with clear urinary efflux bilaterally. The bladder was emptied and the cystoscope removed under direct vision.     The 5mm ports were all removed under direct visualization. The 12mm port was removed and the fascia was then closed with another figure-of-eight suture of #0-vicryl followed by tying the two previously-tagged end of fascia together. All port sites were closed using 4-0 Monocryl in a subcuticular fashion. Steristrips were applied as well as island dressings. The monae catheter was removed. Sponge, needle, instrument counts were correct x 2. Dr. Short was present for the key portions of the procedure. The patient tolerated the procedure well and was returned to the PACU in stable condition.   Complications:  None; patient tolerated the procedure well.    Disposition: PACU - hemodynamically stable.  Condition: stable       Mitzi Pandey MD  Gynecologic Oncology Fellow    Additional Details: None    Attending Attestation:     Cara Shelton  Phone Number: [phone]

## 2025-01-09 NOTE — DISCHARGE INSTRUCTIONS
Gynecologic Surgery Postoperative Instructions    Call the Gynecologic Oncology office at [phone] for any problems and/or concerns.    Walk as much as possible, it is ok to use stairs carefully  Continue the coughing and deep breathing exercises that your learned in the hospital  No lifting/straining greater than 10 pounds for 6 weeks (avoid strenuous activity)  Vaginal rest for 6 weeks (Do not put anything in your vagina. Do not use tampons or douches. Do not have sex until cleared by physician.)   Prevent constipation by using stool softeners and staying hydrated, so that you do not strain against your stitches or have pain from constipation    Call your doctor's office right away for:  Fever above 100.4/shaking chills  Bright red vaginal bleeding or bleeding that soaks more than two sanitary pads per hour  A foul smelling discharge from the vagina  Inability to urinate  Severe pain or bloating in your abdomen  Persistent nausea/vomiting  Redness, swelling, or drainage at your incision sites  For chest pain/shortness of breath-call [phone]    You will be going home with prescriptions for pain medication. If you are able to take them, alternate a dose of Acetaminophen (Tylenol) and Ibuprofen every 3 hours. (For example, 650 mg of Tylenol at 09:00am, 600 mg Ibuprofen at 12:00pm, 650 mg of Tylenol at 3:00pm, 600mg Ibuprofen at 6:00pm). You may also take the two medicines together every 6 hours if that is easier. Use any prescribed narcotic (tramadol or oxycodone) for breakthrough pain as prescribed. Use a stool softener, such as Miralax to prevent constipation from the narcotic medication.     Your incisions have steristrips. You may shower with them in place. Allow warm, soapy water to run over your incisions, then pat to dry. Do not scrub the incisions. The steristrips will start to peel off on its own in the a couple of days to a week.

## 2025-01-09 NOTE — H&P
"History Of Present Illness  Lashawn Bear \"Leona\" is a 74 y.o. female presenting with endometrioid carcinoma of endometrium, here for TLH, BSO, SLND.     EMB 11/8/24 atypical hyperplasia bordering on G1 endometrioid carcinoma     Lab Results   Component Value Date    WBC 5.6 01/02/2025    HGB 14.9 01/02/2025    HCT 45.3 01/02/2025    MCV 99 01/02/2025     01/02/2025     Lab Results   Component Value Date    GLUCOSE 101 (H) 01/02/2025    CALCIUM 9.4 01/02/2025     01/02/2025    K 4.9 01/02/2025    CO2 28 01/02/2025     01/02/2025    BUN 12 01/02/2025    CREATININE 0.85 01/02/2025     TVUS 9/2024:  Retroverted uterus 10.6 x 3.6 x 4 cm. Endometrial thickness 7mm. Ovaries not visualized    Past Medical History:   Diagnosis Date    Basal cell carcinoma of skin of other part of trunk     Basal cell carcinoma of back, s/p WLE in 2012/2013    Cataract 05/19/2021    Dysfunctional uterine bleeding     s/p Hysteroscopy Nov 2024    Eczema Entire life    Elevated coronary artery calcium score     9/2020--Elevated coronary artery calcium score of 154.35*.    History of blood transfusion     Transfused several years ago    Hyperlipidemia     Myopia, bilateral 08/04/2021    Bilateral myopia    Other conditions influencing health status 07/07/2021    Small bowel perforation, intraoperative    Personal history of other diseases of the female genital tract     History of breast disorder    Personal history of other diseases of the female genital tract 02/18/2020    History of postmenopausal bleeding    Puckering of macula, left eye 10/21/2021    Macular puckering of retina, left eye    Unspecified astigmatism, bilateral 10/21/2021    Astigmatism, bilateral    Uterine cancer (Multi)     seen by Dr. Cara Shelton 12/16/24    Vision loss     wears corrective lens     Past Surgical History:   Procedure Laterality Date    ADENOIDECTOMY  04/01/1965    APPENDECTOMY  02/28/2014    Appendectomy    BREAST CYST " "ASPIRATION Right 1999's benign cyst aspiration    CATARACT EXTRACTION       SECTION, LOW TRANSVERSE      C Section x2    CHOLECYSTECTOMY      COLON SURGERY  2021    COLPOSCOPY      DILATION AND CURETTAGE OF UTERUS      HYSTEROSCOPY      OOPHORECTOMY  2014    Oophorectomy - Unilateral (Removal Of One Ovary)    OTHER SURGICAL HISTORY  2019    Mohs surgery    OTHER SURGICAL HISTORY  2018    Biopsy Skin    OTHER SURGICAL HISTORY  2021    Small bowel resection    TONSILLECTOMY  2014    Tonsillectomy    TUBAL LIGATION  10/01/1983    WISDOM TOOTH EXTRACTION  1970     OBGHx:  C section x2  Menopause at age 43    Family History   Problem Relation Name Age of Onset    Basal cell carcinoma Mother Alma     Other (CARDIAC DISORDER) Mother Alma     Heart attack Mother Alma     Heart disease Mother Alma     Diabetes Father Urban     Alzheimer's disease Father Urban     Breast cancer Sister Alma Morrissey 65    Seizures Sister Alma Morrissey     Other (TRAMATIC BRAIN INJURY) Sister Alma Morrissey     Cancer Sister Jyoti      Social History     Tobacco Use    Smoking status: Some Days     Types: Cigarettes    Smokeless tobacco: Never   Vaping Use    Vaping status: Never Used   Substance Use Topics    Alcohol use: Yes     Alcohol/week: 7.0 standard drinks of alcohol     Types: 7 Glasses of wine per week     Comment: one serving daily    Drug use: Never     Meds: ASA, Crestor    No Known Allergies    Objective   Vitals to be obtained. Will fu    General: Alert and oriented, in NAD  Neuro: Awake, alert, conversational  CV: Regular rate  Respiratory: Even and unlabored on RA  Extremities: Full ROM  Psych: appropriate mood and affect    Assessment/Plan     Lashawn Bear \"Leona\" is a 74 y.o. with presenting with endometrioid carcinoma of endometrium, here for TLH, BSO, SLND.    Plan to proceed with procedure as above.  Consent reviewed.    To be d/w Dr. Nikita Garg " MD May PGY-1   Gyn Onc, Pager [phone]

## 2025-01-09 NOTE — CONSULTS
Lashawn Bear is a 74 y.o. year old female patient who presents for total laparoscopic hysterectomy, bilateral salpingo-oophorectomy, sentinel lymph node dissection with Dr Shelton on 2025. Acute Pain consulted for block for postoperative pain control.     Anticipated Postop Pain Issues -   Palliative: typically relieved with IV analgesics and regional local anesthetics  Provocative: typically with movement  Quality: typically burning and aching  Radiation: typically none  Severity: typically severe 8-10/10  Timing: typically constant    Past Medical History:   Diagnosis Date    Basal cell carcinoma of skin of other part of trunk     Basal cell carcinoma of back, s/p WLE in     Cataract 2021    Dysfunctional uterine bleeding     s/p Hysteroscopy 2024    Eczema Entire life    Elevated coronary artery calcium score     2020--Elevated coronary artery calcium score of 154.35*.    History of blood transfusion     Transfused several years ago    Hyperlipidemia     Myopia, bilateral 2021    Bilateral myopia    Other conditions influencing health status 2021    Small bowel perforation, intraoperative    Personal history of other diseases of the female genital tract     History of breast disorder    Personal history of other diseases of the female genital tract 2020    History of postmenopausal bleeding    Puckering of macula, left eye 10/21/2021    Macular puckering of retina, left eye    Unspecified astigmatism, bilateral 10/21/2021    Astigmatism, bilateral    Uterine cancer (Multi)     seen by Dr. Cara Shelton 25    Vision loss     wears corrective lens        Past Surgical History:   Procedure Laterality Date    ADENOIDECTOMY  1965    APPENDECTOMY  2014    Appendectomy    BREAST CYST ASPIRATION Right 1999's benign cyst aspiration    CATARACT EXTRACTION       SECTION, LOW TRANSVERSE      C Section x2    CHOLECYSTECTOMY      COLON  SURGERY  06/21/2021    COLPOSCOPY  1979    DILATION AND CURETTAGE OF UTERUS      HYSTEROSCOPY      OOPHORECTOMY  02/28/2014    Oophorectomy - Unilateral (Removal Of One Ovary)    OTHER SURGICAL HISTORY  04/16/2019    Mohs surgery    OTHER SURGICAL HISTORY  07/30/2018    Biopsy Skin    OTHER SURGICAL HISTORY  07/07/2021    Small bowel resection    TONSILLECTOMY  02/28/2014    Tonsillectomy    TUBAL LIGATION  10/01/1983    WISDOM TOOTH EXTRACTION  02/01/1970        Family History   Problem Relation Name Age of Onset    Basal cell carcinoma Mother Alma     Other (CARDIAC DISORDER) Mother Alma     Heart attack Mother Alma     Heart disease Mother Alma     Diabetes Father Urban     Alzheimer's disease Father Urban     Breast cancer Sister Alma Morrissey 65    Seizures Sister Alma Morrissey     Other (TRAMATIC BRAIN INJURY) Sister Alma Morrissey     Cancer Sister Jyoti         Social History     Socioeconomic History    Marital status:      Spouse name: Not on file    Number of children: Not on file    Years of education: Not on file    Highest education level: Not on file   Occupational History    Not on file   Tobacco Use    Smoking status: Some Days     Types: Cigarettes    Smokeless tobacco: Never   Vaping Use    Vaping status: Never Used   Substance and Sexual Activity    Alcohol use: Yes     Alcohol/week: 7.0 standard drinks of alcohol     Types: 7 Glasses of wine per week     Comment: one serving daily    Drug use: Never    Sexual activity: Defer   Other Topics Concern    Not on file   Social History Narrative    Not on file     Social Drivers of Health     Financial Resource Strain: Not on file   Food Insecurity: Not on file   Transportation Needs: Not on file   Physical Activity: Not on file   Stress: Not on file   Social Connections: Not on file   Intimate Partner Violence: Not on file   Housing Stability: Not on file        No Known Allergies      Review of Systems  Gen: No fatigue, anorexia, insomnia, fever.   Eyes:  No vision loss, double vision, drainage, eye pain.   ENT: No pharyngitis, dry mouth, no hearing changes or ear discharge  Cardiac: No chest pain, palpitations, syncope, near syncope.   Pulmonary: No shortness of breath, cough, hemoptysis.   Heme/lymph: No swollen glands, fever, bleeding.   GI: No abdominal pain, change in bowel habits, melena, hematemesis, hematochezia, nausea, vomiting, diarrhea.   : No discharge, dysuria, frequency, urgency, hematuria.  Endo: No polyuria or weight loss.   Musculoskeletal: Negative for any pain or loss of ROM/weakness  Skin: No rashes or lesions  Neuro: Normal speech, no numbness or weakness. No gait difficulties  Review of systems is otherwise negative unless stated above or in history of present illness.    Physical Exam:  Constitutional:  no distress, alert and cooperative  Eyes: clear sclera  Head/Neck: No apparent injury, trachea midline  Respiratory/Thorax: Patent airways, thorax symmetric, breathing comfortably  Cardiovascular: no pitting edema  Gastrointestinal: Nondistended  Musculoskeletal: ROM intact  Extremities: no clubbing  Neurological: alert, pineda x4  Psychological: Appropriate affect    No results found for this or any previous visit (from the past 24 hours).     Lashawn Bear is a 74 y.o. year old female patient who presents for total laparoscopic hysterectomy, bilateral salpingo-oophorectomy, sentinel lymph node dissection with Dr Shelton on 1/9/2025. Acute Pain consulted for block for postoperative pain control.     Plan:    - Bilateral quadratus lumborum blocks performed preoperatively on 1/9/2025  - Pain medications per primary team  - Will see on POD1 if inpatient    Acute Pain Team  pg [phone] ph [phone].

## 2025-01-09 NOTE — ANESTHESIA PREPROCEDURE EVALUATION
"Patient: Lashawn Bear \"Leona\"    Procedure Information       Date/Time: 01/09/25 1050    Procedure: total laparoscopic hysterectomy, bilateral salpingo-oophorectomy, sentinel lymph node dissection (Bilateral)    Location: [facility] OR 03 / [facility] OR    Surgeons: Cara Shelton MD            Relevant Problems   Anesthesia (within normal limits)      Cardiac   (+) Hyperlipidemia      Skin   (+) Basal cell carcinoma   (+) Basal cell carcinoma of skin of scalp and neck      GYN   (+) Endometrioid carcinoma of endometrium (Multi)       Clinical information reviewed:   Tobacco  Allergies  Meds   Med Hx  Surg Hx   Fam Hx  Soc Hx        NPO Detail:  NPO/Void Status  Carbohydrate Drink Given Prior to Surgery? : N  Date of Last Liquid: 01/08/25  Time of Last Liquid: 0000  Date of Last Solid: 01/08/25  Time of Last Solid: 2230  Last Intake Type: Food         Physical Exam    Airway  Mallampati: II  TM distance: >3 FB  Neck ROM: full     Cardiovascular - normal exam     Dental - normal exam     Pulmonary - normal exam  Breath sounds clear to auscultation     Abdominal - normal exam             Anesthesia Plan    History of general anesthesia?: yes  History of complications of general anesthesia?: no    ASA 3     general     intravenous induction   Postoperative administration of opioids is intended.  Trial extubation is planned.  Anesthetic plan and risks discussed with patient.    Plan discussed with resident.      "

## 2025-01-09 NOTE — ANESTHESIA POSTPROCEDURE EVALUATION
"Patient: Lashawn Bear \"Leona\"    Procedure Summary       Date: 01/09/25 Room / Location: [facility] OR 03 / [facility]    Anesthesia Start: 1114 Anesthesia Stop: 1418    Procedure: total laparoscopic hysterectomy, right salpingo-oophorectomy, cystoscopy Diagnosis:       Endometrioid carcinoma of endometrium (Multi)      (Endometrioid carcinoma of endometrium (Multi) [C54.1])    Surgeons: Cara Shelton MD Responsible Provider: Hugo Davis DO    Anesthesia Type: general ASA Status: 3            Anesthesia Type: general    Vitals Value Taken Time   /75 01/09/25 1412   Temp 36 01/09/25 1418   Pulse 73 01/09/25 1413   Resp 10 01/09/25 1413   SpO2 98 % 01/09/25 1413   Vitals shown include unfiled device data.    Anesthesia Post Evaluation    Patient location during evaluation: bedside  Patient participation: complete - patient participated  Level of consciousness: awake  Pain score: 0  Pain management: adequate  Airway patency: patent  Cardiovascular status: acceptable  Respiratory status: acceptable  Hydration status: acceptable  Postoperative Nausea and Vomiting: none        No notable events documented.    "

## 2025-01-09 NOTE — ANESTHESIA PROCEDURE NOTES
Airway  Date/Time: 1/9/2025 11:20 AM  Urgency: elective    Airway not difficult    Staffing  Performed: resident   Authorized by: Hugo Davis DO    Performed by: Kalani Kilgore MD  Patient location during procedure: OR    Indications and Patient Condition  Indications for airway management: anesthesia  Spontaneous Ventilation: absent  Sedation level: deep  Preoxygenated: yes  Patient position: sniffing  Mask difficulty assessment: 1 - vent by mask  Planned trial extubation    Final Airway Details  Final airway type: endotracheal airway      Successful airway: ETT  Cuffed: yes   Successful intubation technique: direct laryngoscopy  Facilitating devices/methods: intubating stylet  Endotracheal tube insertion site: oral  Blade: Chuyita  Blade size: #3  ETT size (mm): 6.5  Cormack-Lehane Classification: grade I - full view of glottis  Placement verified by: chest auscultation and capnometry   Inital cuff pressure (cm H2O): 5  Measured from: lips  ETT to lips (cm): 22  Number of attempts at approach: 1

## 2025-01-17 DIAGNOSIS — C54.1 ENDOMETRIOID CARCINOMA OF ENDOMETRIUM (MULTI): Primary | ICD-10-CM

## 2025-01-17 LAB
LAB AP ASR DISCLAIMER: NORMAL
LAB AP BLOCK FOR ADDITIONAL STUDIES: NORMAL
LABORATORY COMMENT REPORT: NORMAL
PATH REPORT.COMMENTS IMP SPEC: NORMAL
PATH REPORT.FINAL DX SPEC: NORMAL
PATH REPORT.GROSS SPEC: NORMAL
PATH REPORT.RELEVANT HX SPEC: NORMAL
PATH REPORT.TOTAL CANCER: NORMAL
PATHOLOGY SYNOPTIC REPORT: NORMAL

## 2025-01-21 LAB
LAB AP ASR DISCLAIMER: NORMAL
LAB AP BLOCK FOR ADDITIONAL STUDIES: NORMAL
LABORATORY COMMENT REPORT: NORMAL
PATH REPORT.ADDENDUM SPEC: NORMAL
PATH REPORT.ADDENDUM SPEC: NORMAL
PATH REPORT.COMMENTS IMP SPEC: NORMAL
PATH REPORT.FINAL DX SPEC: NORMAL
PATH REPORT.GROSS SPEC: NORMAL
PATH REPORT.RELEVANT HX SPEC: NORMAL
PATH REPORT.TOTAL CANCER: NORMAL
PATHOLOGY SYNOPTIC REPORT: NORMAL

## 2025-01-22 NOTE — TUMOR BOARD NOTE
Gynecologic Oncology Tumor Board 2025         Lashawn ANA Bear  74 y.o.    1950  MRN [id number]    Provider: Cara Shelton MD  Disease Site: Uterine  Pathology: endometrioid adenocarcinoma  ndGndrndanddndend:nd nd2nd Stage: IA (IA2 by  staging)    We reviewed previous medical and familial history, history of present illness, and recent lab results along with all available histopathologic and imaging studies. The tumor board considered available treatment options and made the following recommendations.    Recommendations:     Surveillance     Clinical Trial Consideration: None Available    National site-specific guidelines were discussed with respect to the case. It is recognized that there may be additional factors not discussed in the Review Board discussion that can influence management decisions, and alternative management options which fall within the standard of care. Accordingly the final treatment disposition will be determined by the patient, in the context of an informed discussion with their providers. The actual prescribed management or treatment plan may or may not be consistent with the Review Board recommendations.

## 2025-01-24 ENCOUNTER — APPOINTMENT (OUTPATIENT)
Dept: HEMATOLOGY/ONCOLOGY | Facility: HOSPITAL | Age: 75
End: 2025-01-24
Payer: MEDICARE

## 2025-02-03 ENCOUNTER — OFFICE VISIT (OUTPATIENT)
Dept: GYNECOLOGIC ONCOLOGY | Facility: CLINIC | Age: 75
End: 2025-02-03
Payer: MEDICARE

## 2025-02-03 VITALS
WEIGHT: 191.8 LBS | RESPIRATION RATE: 16 BRPM | SYSTOLIC BLOOD PRESSURE: 128 MMHG | BODY MASS INDEX: 30.04 KG/M2 | DIASTOLIC BLOOD PRESSURE: 79 MMHG | TEMPERATURE: 97.7 F | HEART RATE: 85 BPM | OXYGEN SATURATION: 93 %

## 2025-02-03 DIAGNOSIS — C54.1 ENDOMETRIOID CARCINOMA OF ENDOMETRIUM (MULTI): Primary | ICD-10-CM

## 2025-02-03 PROCEDURE — 1157F ADVNC CARE PLAN IN RCRD: CPT | Performed by: OBSTETRICS & GYNECOLOGY

## 2025-02-03 PROCEDURE — 99024 POSTOP FOLLOW-UP VISIT: CPT | Performed by: OBSTETRICS & GYNECOLOGY

## 2025-02-03 PROCEDURE — 1126F AMNT PAIN NOTED NONE PRSNT: CPT | Performed by: OBSTETRICS & GYNECOLOGY

## 2025-02-03 PROCEDURE — 1159F MED LIST DOCD IN RCRD: CPT | Performed by: OBSTETRICS & GYNECOLOGY

## 2025-02-03 PROCEDURE — 99213 OFFICE O/P EST LOW 20 MIN: CPT | Performed by: OBSTETRICS & GYNECOLOGY

## 2025-02-03 ASSESSMENT — PAIN SCALES - GENERAL: PAINLEVEL_OUTOF10: 0-NO PAIN

## 2025-02-03 NOTE — PROGRESS NOTES
Patient ID: Leona Bear is a 74 y.o. female.  Referring Physician: No referring provider defined for this encounter.  Primary Care Provider: Montse Lock MD    Subjective    Doing well  Denies pain    Eating well, bowel and bladder habits are normal    Ambulating without difficulty          Objective    BSA: 2.03 meters squared  /79   Pulse 85   Temp 36.5 °C (97.7 °F)   Resp 16   Wt 87 kg (191 lb 12.8 oz)   SpO2 93%   BMI 30.04 kg/m²      Physical Exam  Constitutional:       Appearance: Normal appearance.   Cardiovascular:      Rate and Rhythm: Normal rate.      Heart sounds: Normal heart sounds.   Pulmonary:      Effort: Pulmonary effort is normal.      Breath sounds: Normal breath sounds.   Abdominal:      General: Abdomen is flat. There is no distension.      Palpations: There is no mass.      Tenderness: There is no abdominal tenderness.   Genitourinary:     General: Normal vulva.      Exam position: Lithotomy position.      Uterus: Absent.       Comments: Cervix and uterine surgically absent.  Vaginal cuff healing well  Musculoskeletal:      Cervical back: Normal range of motion.   Skin:     General: Skin is warm and dry.   Neurological:      Mental Status: She is alert.   Psychiatric:         Mood and Affect: Mood normal.         Behavior: Behavior normal.         Performance Status:  Symptomatic; fully ambulatory    Assessment/Plan     Oncology History Overview Note   EMB 11/8/24 atypical hyperplasia bordering on G1 endometrioid carcinoma  US pelvis - uterus normal size, EMS 7mm  1/9/25 TLH/BSO.  Final path Stage IA G1 EMCA.  TB recs for surveillance       Endometrioid carcinoma of endometrium (Multi)   12/16/2024 Initial Diagnosis    Endometrioid carcinoma of endometrium (Multi)          Problem List Items Addressed This Visit             ICD-10-CM    Endometrioid carcinoma of endometrium (Multi) - Primary C54.1       Treatment Plans       No treatment plans exist          Doing well.   Pathology report and tumor board recommendations reviewed.  Surveillance recommendations reviewed.  Follow up for cancer surveillance in 4 months.  Signs/symptoms of cancer recurrence reviewed.

## 2025-02-05 ENCOUNTER — HOSPITAL ENCOUNTER (OUTPATIENT)
Dept: RADIOLOGY | Facility: CLINIC | Age: 75
Discharge: HOME | End: 2025-02-05
Payer: MEDICARE

## 2025-02-05 DIAGNOSIS — R92.8 ABNORMAL SCREENING MAMMOGRAM: ICD-10-CM

## 2025-02-05 DIAGNOSIS — R92.8 ABNORMAL SCREENING MAMMOGRAM: Primary | ICD-10-CM

## 2025-02-05 PROCEDURE — 76982 USE 1ST TARGET LESION: CPT | Mod: RT

## 2025-02-05 PROCEDURE — 77061 BREAST TOMOSYNTHESIS UNI: CPT | Mod: RT

## 2025-02-05 PROCEDURE — 76642 ULTRASOUND BREAST LIMITED: CPT | Mod: RT

## 2025-06-02 ENCOUNTER — OFFICE VISIT (OUTPATIENT)
Dept: GYNECOLOGIC ONCOLOGY | Facility: CLINIC | Age: 75
End: 2025-06-02
Payer: MEDICARE

## 2025-06-02 VITALS
OXYGEN SATURATION: 94 % | RESPIRATION RATE: 16 BRPM | BODY MASS INDEX: 30.04 KG/M2 | TEMPERATURE: 97.5 F | WEIGHT: 191.8 LBS | HEART RATE: 75 BPM | DIASTOLIC BLOOD PRESSURE: 66 MMHG | SYSTOLIC BLOOD PRESSURE: 123 MMHG

## 2025-06-02 DIAGNOSIS — C54.1 ENDOMETRIOID CARCINOMA OF ENDOMETRIUM: Primary | ICD-10-CM

## 2025-06-02 DIAGNOSIS — Z85.40 ENCOUNTER FOR FOLLOW-UP SURVEILLANCE OF CANCER OF FEMALE GENITAL TRACT ORGAN: ICD-10-CM

## 2025-06-02 DIAGNOSIS — Z08 ENCOUNTER FOR FOLLOW-UP SURVEILLANCE OF CANCER OF FEMALE GENITAL TRACT ORGAN: ICD-10-CM

## 2025-06-02 PROCEDURE — 99214 OFFICE O/P EST MOD 30 MIN: CPT | Performed by: NURSE PRACTITIONER

## 2025-06-02 PROCEDURE — 1125F AMNT PAIN NOTED PAIN PRSNT: CPT | Performed by: NURSE PRACTITIONER

## 2025-06-02 PROCEDURE — 1159F MED LIST DOCD IN RCRD: CPT | Performed by: NURSE PRACTITIONER

## 2025-06-02 PROCEDURE — G2211 COMPLEX E/M VISIT ADD ON: HCPCS | Performed by: NURSE PRACTITIONER

## 2025-06-02 ASSESSMENT — PAIN SCALES - GENERAL: PAINLEVEL_OUTOF10: 7

## 2025-06-02 NOTE — PROGRESS NOTES
Patient ID: Leona Bear is a 75 y.o. female.  Referring Physician: No referring provider defined for this encounter.  Primary Care Provider: Montse Lock MD    Subjective    Overall doing well since surgery. Has ongoing dull aching in her right knee since surgery, no associated swelling or erythema. Has been active and feeling well otherwise. Denies any vaginal bleeding, abdominal pain, bloating.       Objective    BSA: 2.03 meters squared  /66   Pulse 75   Temp 36.4 °C (97.5 °F)   Resp 16   Wt 87 kg (191 lb 12.8 oz)   SpO2 94%   BMI 30.04 kg/m²      Physical Exam  Constitutional:       Appearance: Normal appearance.   Cardiovascular:      Rate and Rhythm: Normal rate.      Heart sounds: Normal heart sounds.   Pulmonary:      Effort: Pulmonary effort is normal.      Breath sounds: Normal breath sounds.   Abdominal:      General: Abdomen is flat. There is no distension.      Palpations: There is no mass.      Tenderness: There is no abdominal tenderness.   Genitourinary:     General: Normal vulva.      Exam position: Lithotomy position.      Uterus: Absent.       Comments: Cervix and uterine surgically absent. Vaginal cuff well healed. No lesions or evidence of vaginal bleeding.   Musculoskeletal:      Cervical back: Normal range of motion.   Skin:     General: Skin is warm and dry.   Neurological:      Mental Status: She is alert.   Psychiatric:         Mood and Affect: Mood normal.         Behavior: Behavior normal.         Performance Status:  Symptomatic; fully ambulatory    Assessment/Plan     Oncology History Overview Note   EMB 11/8/24 atypical hyperplasia bordering on G1 endometrioid carcinoma  US pelvis - uterus normal size, EMS 7mm  1/9/25 TLH/BSO.  Final path Stage IA G1 EMCA.  TB recs for surveillance       Endometrioid carcinoma of endometrium   12/16/2024 Initial Diagnosis    Endometrioid carcinoma of endometrium (Multi)          Problem List Items Addressed This Visit            ICD-10-CM       Hematology and Neoplasia    Endometrioid carcinoma of endometrium - Primary C54.1     Other Visit Diagnoses         Codes      Encounter for follow-up surveillance of cancer of female genital tract organ     Z08, Z85.40          Physical examination was within normal limits today.  She is currently NACHO.  We reviewed signs and symptoms of possible recurrence with the patient and she will call our office should she experience any of these.    Follow up for cancer surveillance in 4 months.    Maira Bolivar, APRN-CNP

## 2025-07-22 DIAGNOSIS — E78.5 HYPERLIPIDEMIA, UNSPECIFIED HYPERLIPIDEMIA TYPE: ICD-10-CM

## 2025-07-22 RX ORDER — ROSUVASTATIN CALCIUM 10 MG/1
10 TABLET, COATED ORAL DAILY
Qty: 90 TABLET | Refills: 2 | Status: SHIPPED | OUTPATIENT
Start: 2025-07-22

## 2025-08-13 ENCOUNTER — HOSPITAL ENCOUNTER (OUTPATIENT)
Dept: RADIOLOGY | Facility: CLINIC | Age: 75
End: 2025-08-13
Payer: MEDICARE

## 2025-08-20 ENCOUNTER — HOSPITAL ENCOUNTER (OUTPATIENT)
Dept: RADIOLOGY | Facility: CLINIC | Age: 75
Discharge: HOME | End: 2025-08-20
Payer: MEDICARE

## 2025-08-20 DIAGNOSIS — R92.8 ABNORMAL SCREENING MAMMOGRAM: ICD-10-CM

## 2025-08-20 PROCEDURE — 77065 DX MAMMO INCL CAD UNI: CPT | Mod: RIGHT SIDE | Performed by: STUDENT IN AN ORGANIZED HEALTH CARE EDUCATION/TRAINING PROGRAM

## 2025-08-20 PROCEDURE — 77065 DX MAMMO INCL CAD UNI: CPT | Mod: RT

## 2025-08-20 PROCEDURE — 77061 BREAST TOMOSYNTHESIS UNI: CPT | Mod: RIGHT SIDE | Performed by: STUDENT IN AN ORGANIZED HEALTH CARE EDUCATION/TRAINING PROGRAM

## 2025-08-21 ENCOUNTER — RESULTS FOLLOW-UP (OUTPATIENT)
Dept: PRIMARY CARE | Facility: CLINIC | Age: 75
End: 2025-08-21
Payer: MEDICARE

## 2025-08-21 DIAGNOSIS — R92.8 ABNORMAL MAMMOGRAM: Primary | ICD-10-CM

## 2025-10-01 ENCOUNTER — APPOINTMENT (OUTPATIENT)
Dept: PRIMARY CARE | Facility: CLINIC | Age: 75
End: 2025-10-01
Payer: MEDICARE

## (undated) DEVICE — MANIFOLD, 4 PORT NEPTUNE STANDARD

## (undated) DEVICE — SYRINGE, 60 CC, LUER LOCK, MONOJECT, W/O CAP, LF

## (undated) DEVICE — OCCLUDER, COLPO-PNEUMO

## (undated) DEVICE — TOWEL, SURGICAL, NEURO, O/R, 16 X 26, BLUE, STERILE

## (undated) DEVICE — COVER, CART, 45 X 27 X 48 IN, CLEAR

## (undated) DEVICE — PREP TRAY, SKIN, DRY, W/GLOVES

## (undated) DEVICE — LIGASURE, V SEALER/DIVIDER  5MM BLUNT TIP

## (undated) DEVICE — DRAPE, PAD, PREP, W/ 9 IN CUFF, 24 X 41, LF, NS

## (undated) DEVICE — BAG, TISSUE RETRIEVAL, 10MM, ANCHOR

## (undated) DEVICE — TROCAR, KII OPTICAL BLADELESS 5MM Z THREAD 100MM LNGTH

## (undated) DEVICE — TUBE SET, PNEUMOCLEAR, SMOKE EVACU, HIGH-FLOW

## (undated) DEVICE — Device

## (undated) DEVICE — HOLSTER, JET SAFETY

## (undated) DEVICE — GLOVE, SURGICAL, PROTEXIS PI , 6.5, PF, LF

## (undated) DEVICE — RETRACTOR, CERVICAL CUP, VCARE, STANDARD

## (undated) DEVICE — ACCESSORY, AVETA, WASTE MANAGEMENT WITH CAP

## (undated) DEVICE — DEVICE, RESECTING, AVETA FLEX, 2.9MM

## (undated) DEVICE — TUBE, SALEM SUMP, 16 FR X 48IN, ENFIT

## (undated) DEVICE — SUTURE, V-LOC, 0, 12IN, GS-21, GR 180 ABS

## (undated) DEVICE — SLEEVE, SURGICAL, 21.5 X 5.5 IN, LF, STERILE

## (undated) DEVICE — REST, HEAD, BAGEL, 9 IN

## (undated) DEVICE — SYRINGE, W/CANNULA, VIAL ACCESS, INTERLINK, W/NEEDLE, 15 G

## (undated) DEVICE — TROCAR SYSTEM, BALLOON, KII GELPORT, 12 X 100MM

## (undated) DEVICE — PREP TRAY, GYNECOLOGY

## (undated) DEVICE — SUTURE, VICRYL, 0, 27 IN, UR-6, VIOLET

## (undated) DEVICE — GOWN, SURGICAL, SMARTGOWN, XLARGE, STERILE

## (undated) DEVICE — ELECTRODE, OPTI2 LAPAROSCOPIC SPATULA, CURVED

## (undated) DEVICE — SUTURE, VICRYL, 4-0, 18 IN, UNDYED BR PS-2

## (undated) DEVICE — IRRIGATION SET, CYSTOSCOPY, F/CONSTANT/INTERMITTENT, 8 GTT/CC, 77 IN

## (undated) DEVICE — POSITIONING, THE PINK PAD, PIGAZZI SYSTEM

## (undated) DEVICE — GOWN, ASTOUND, L

## (undated) DEVICE — DRESSING, ADHESIVE, ISLAND, TELFA, 2 X 3.75 IN, LF

## (undated) DEVICE — HYSTEROSCOPE, AVETA CORAL, 4.6MM